# Patient Record
Sex: FEMALE | Race: WHITE | Employment: OTHER | URBAN - METROPOLITAN AREA
[De-identification: names, ages, dates, MRNs, and addresses within clinical notes are randomized per-mention and may not be internally consistent; named-entity substitution may affect disease eponyms.]

---

## 2017-04-17 ENCOUNTER — TRANSFERRED RECORDS (OUTPATIENT)
Dept: HEALTH INFORMATION MANAGEMENT | Facility: CLINIC | Age: 47
End: 2017-04-17

## 2017-05-01 ENCOUNTER — MEDICAL CORRESPONDENCE (OUTPATIENT)
Dept: HEALTH INFORMATION MANAGEMENT | Facility: CLINIC | Age: 47
End: 2017-05-01

## 2017-06-06 ENCOUNTER — TRANSFERRED RECORDS (OUTPATIENT)
Dept: HEALTH INFORMATION MANAGEMENT | Facility: CLINIC | Age: 47
End: 2017-06-06

## 2017-07-03 ENCOUNTER — TRANSFERRED RECORDS (OUTPATIENT)
Dept: HEALTH INFORMATION MANAGEMENT | Facility: CLINIC | Age: 47
End: 2017-07-03

## 2017-08-03 ENCOUNTER — TRANSFERRED RECORDS (OUTPATIENT)
Dept: HEALTH INFORMATION MANAGEMENT | Facility: CLINIC | Age: 47
End: 2017-08-03

## 2017-08-07 ENCOUNTER — TRANSFERRED RECORDS (OUTPATIENT)
Dept: HEALTH INFORMATION MANAGEMENT | Facility: CLINIC | Age: 47
End: 2017-08-07

## 2017-08-19 ENCOUNTER — HEALTH MAINTENANCE LETTER (OUTPATIENT)
Age: 47
End: 2017-08-19

## 2017-08-24 ENCOUNTER — TRANSFERRED RECORDS (OUTPATIENT)
Dept: HEALTH INFORMATION MANAGEMENT | Facility: CLINIC | Age: 47
End: 2017-08-24

## 2017-08-31 ENCOUNTER — TRANSFERRED RECORDS (OUTPATIENT)
Dept: HEALTH INFORMATION MANAGEMENT | Facility: CLINIC | Age: 47
End: 2017-08-31

## 2017-09-07 ENCOUNTER — TRANSFERRED RECORDS (OUTPATIENT)
Dept: HEALTH INFORMATION MANAGEMENT | Facility: CLINIC | Age: 47
End: 2017-09-07

## 2017-09-13 ENCOUNTER — TRANSFERRED RECORDS (OUTPATIENT)
Dept: HEALTH INFORMATION MANAGEMENT | Facility: CLINIC | Age: 47
End: 2017-09-13

## 2017-09-13 ENCOUNTER — PRE VISIT (OUTPATIENT)
Dept: ORTHOPEDICS | Facility: CLINIC | Age: 47
End: 2017-09-13

## 2017-09-13 NOTE — TELEPHONE ENCOUNTER
1.  Date/reason for appt: 9/22/17 -- Stenosis, herniation L4-L5    2.  Referring provider: Chloe Encarnacion    3.  Call to patient (Yes / No - short description): no, transferred from Call Center -- spoke to pt.  Referred from PN and has additional records at [facility], [facility] and [facility] and [facility].  Emailed 3 SHERIN's to [email or web address]    4.  Previous care at / records requested from: [facility], [facility], [facility] and [facility]

## 2017-09-15 NOTE — TELEPHONE ENCOUNTER
Records received from [facility].   Included  Office notes: 5/10/17, 6/23/17, 8/3/17, 8/7/17, 8/31/17  PT/OT notes: 5/1/17, 5/18/17, 5/22/17, 5/30/17, 6/5/17  Radiology reports: MRI lumbar 4/17/17  MRI cervical 6/13/16  Injections: right L4-5 transforaminal epidural steroid injection 8/24/17  Other: EMG 8/7/17    Missing/needed records: Dr. Nestor Sheth records

## 2017-09-16 ASSESSMENT — ENCOUNTER SYMPTOMS
DIFFICULTY URINATING: 1
JOINT SWELLING: 0
BACK PAIN: 1
WEIGHT LOSS: 0
POLYPHAGIA: 0
INCREASED ENERGY: 1
FEVER: 0
NIGHT SWEATS: 1
DYSURIA: 0
DISTURBANCES IN COORDINATION: 1
HEADACHES: 1
NERVOUS/ANXIOUS: 1
WEAKNESS: 1
NECK PAIN: 1
SEIZURES: 0
LOSS OF CONSCIOUSNESS: 0
HOT FLASHES: 1
MUSCLE CRAMPS: 1
WEIGHT GAIN: 0
DIZZINESS: 1
PANIC: 1
POLYDIPSIA: 1
DEPRESSION: 1
FLANK PAIN: 1
NUMBNESS: 1
FATIGUE: 1
HALLUCINATIONS: 0
DECREASED LIBIDO: 1
SPEECH CHANGE: 0
DECREASED CONCENTRATION: 1
TREMORS: 0
MEMORY LOSS: 1
MUSCLE WEAKNESS: 1
HEMATURIA: 0
PARALYSIS: 0
ARTHRALGIAS: 1
TINGLING: 1
INSOMNIA: 0
STIFFNESS: 1
DECREASED APPETITE: 1
ALTERED TEMPERATURE REGULATION: 1
CHILLS: 0
MYALGIAS: 1

## 2017-09-19 NOTE — PROGRESS NOTES
Spine Surgery Consultation    REFERRING PHYSICIAN: Chloe Encarnacion   PRIMARY CARE PHYSICIAN: Bernie Mata           Chief Complaint:   Back Pain (low back pain radiating to bilateral lower extremities, right greater than left)      History of Present Illness:  Symptom Profile Including: location of symptoms, onset, severity, exacerbating/alleviating factors, previous treatments:        Laurie Pradhan is a 47 year old female who presents for evaluation of neurogenic claudication and bilateral leg cramping type symptoms. worse when she lies down. She can't walk very far and has to use a cane or a walker. She is severely limited in her activities. She was previously working as a writer, educator and therapist but she has had 2 slow down substantially due to the symptoms. She feels it is a zinging type pain. It is worse when she tries to straighten her leg, particularly on the right. It does go into both legs but is worse on the right than the left.    She is previously tried anti-inflammatories and various types of muscle relaxants. She is also had an L4 5 epidural steroid injection. This made the symptoms worse. She had an EMG done which did not show any obvious pathology. She also had both a supine in a standing lumbar MRI. The supine MRI is available to me, but the standing MRI is not. She denies any bowel or bladder symptoms. 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 She has been both to neurology, physical medicine and rehabilitation, pain medicine as well as her primary care doctor in search of an answer regarding this problem.             Past Medical History:     Past Medical History:   Diagnosis Date     Allergic rhinitis due to other allergen     seasonal     Attention deficit disorder without mention of hyperactivity     follows with Dr. Monika Whittaker- psychiatrist at [facility]     Back injury March 2015    excruciating pain whenever I drove my Prius... injury?     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     Reduced vision     nearsightedness began, glasses/contacts     Surgical complication 2007    emergency ; very ill after            Past Surgical History:     Past Surgical History:   Procedure Laterality Date     C NONSPECIFIC PROCEDURE      removal of a cyst between bladder and uterus     C STOMACH SURGERY PROCEDURE UNLISTED  2007    emergency c section     HC TOOTH EXTRACTION W/FORCEP              Social History:     Social History   Substance Use Topics     Smoking status: Former Smoker     Packs/day: 0.50     Years: 10.00     Types: Cigarettes     Smokeless tobacco: Never Used      Comment: 1 daily     Alcohol use Yes      Comment: social            Family History:     Family History   Problem Relation Age of Onset     C.A.D. Maternal Grandfather      older age; bypass surgery     Arthritis Maternal Grandfather      Eye Disorder Maternal Grandfather      CEREBROVASCULAR DISEASE Maternal Grandmother      Genitourinary Problems Maternal Grandmother      frequent bladder infections in older age     Gynecology Maternal Grandmother      CANCER Maternal Grandmother      leg     Prostate Cancer Father      Hypertension Father      Cardiac Sudden Death Father      we think so; or maybe an aneurism     Arthritis Mother      Depression Mother      Eye Disorder Mother      Psychotic Disorder Mother      Eye Disorder Paternal Grandfather      blind            Allergies:     Allergies   Allergen Reactions     Dilaudid [Codeine] Itching     Penicillin G             Medications:     Current Outpatient Prescriptions  "  Medication     ibuprofen 200 MG capsule     Cholecalciferol (VITAMIN D3) 1000 UNITS CAPS     cyclobenzaprine (FLEXERIL) 5 MG tablet     Fluocinolone Acetonide (FLUOCINOLONE ACETAONIDE) 0.01 % oil     ketoconazole (NIZORAL) 2 % shampoo     LORazepam (ATIVAN) 0.5 MG tablet     methocarbamol (ROBAXIN) 500 MG tablet     amphetamine-dextroamphetamine (ADDERALL XR) 15 MG per 24 hr capsule     amphetamine-dextroamphetamine (ADDERALL) 20 MG per tablet     traZODone (DESYREL) 50 MG tablet     ACETAMINOPHEN PO     PROZAC 20 MG OR CAPS     No current facility-administered medications for this visit.              Review of Systems:     A 10 point ROS was performed and reviewed. Specific responses to these questions are noted at the end of the document.         Physical Exam:   Vitals: Ht 1.74 m (5' 8.5\")  Wt 62.9 kg (138 lb 9.6 oz)  BMI 20.77 kg/m2  Constitutional: awake, alert, cooperative, no apparent distress, appears stated age.    Eyes: The sclera are white.  Ears, Nose, Throat: The trachea is midline.  Psychiatric: The patient has a normal affect.  Respiratory: breathing non-labored  Cardiovascular: The extremities are warm and perfused.  Skin: no obvious rashes or lesions.  Musculoskeletal, Neurologic, and Spine:   The bilateral lower extremities are examined. She has 5 out of 5 strength in hip flexion and knee extension deflection tibialis anterior and ankle plantar flexion bilaterally. Her left extensor hallux longus is 5 out of 5 but the right is 4 out of 5. She is a mildly positive right straight leg raise. She is diffusely hyperesthetic. +2 patellar and Achilles reflexes. She is observed to walk with an antalgic gait. I did test abdominal reflexes and thoracic sensation and these are normal. I also examined her upper extremities which had normal strength and sensation throughout. Negative Carla sign.         Imaging:   We ordered and independently reviewed new radiographs at this clinic visit. The results " were discussed with the patient.  Findings include:    New AP lateral flexion-extension lumbar radiographs were ordered and reviewed today. No obvious instability. Relative flattening of the lumbar lordosis. Mild spondylosis worst at L4 5 and L5-S1.    Outside MRI from April 17, 2017 is reviewed. Mild spondylosis at L4 5 with a broad-based disc bulge on the left side appears to cause slight impingement of the traversing L5 root. There is an annular tear of the L4 5 disc.             Assessment and Plan:   Assessment:  47 year old female with bilateral leg cramping and pain.     Plan:  1. Her April MRI is not very impressive. There is a small annular tear but no severe stenosis.  2. I would like to review her standing MRI. It is possible there is some dynamic change which could explain the symptoms.  3. It is also possible to have diffuse leg cramping and hyperesthesia in the setting of a thoracic or cervical spinal cord compression. Thus I would like her to obtain cervical and thoracic MRIs.  4. We discussed trying gabapentin. She does not want to do this. We also discussed attempting a discogram to see if the L4 5 annular tear is contributed to her symptoms. She would prefer not to do this at this time.  5. I will see her back in clinic once the additional imaging is available. No new x-rays needed at that visit.      Respectfully,  Salvador Smith MD  Spine Surgery  [facility]      Answers for HPI/ROS submitted by the patient on 9/16/2017   General Symptoms: Yes  Skin Symptoms: No  HENT Symptoms: No  EYE SYMPTOMS: No  HEART SYMPTOMS: No  LUNG SYMPTOMS: No  INTESTINAL SYMPTOMS: No  URINARY SYMPTOMS: Yes  GYNECOLOGIC SYMPTOMS: Yes  BREAST SYMPTOMS: No  SKELETAL SYMPTOMS: Yes  BLOOD SYMPTOMS: No  NERVOUS SYSTEM SYMPTOMS: Yes  MENTAL HEALTH SYMPTOMS: Yes  Fever: No  Loss of appetite: Yes  Weight loss: No  Weight gain: No  Fatigue: Yes  Night sweats: Yes  Chills: No  Increased stress: Yes  Excessive  hunger: No  Excessive thirst: Yes  Feeling hot or cold when others believe the temperature is normal: Yes  Loss of height: No  Post-operative complications: No  Surgical site pain: Yes  Hallucinations: No  Change in or Loss of Energy: Yes  Hyperactivity: No  Confusion: No  Trouble holding urine or incontinence: No  Pain or burning: No  Trouble starting or stopping: No  Increased frequency of urination: Yes  Blood in urine: No  Decreased frequency of urination: No  Frequent nighttime urination: Yes  Flank pain: Yes  Difficulty emptying bladder: Yes  Back pain: Yes  Muscle aches: Yes  Neck pain: Yes  Swollen joints: No  Joint pain: Yes  Bone pain: Yes  Muscle cramps: Yes  Muscle weakness: Yes  Joint stiffness: Yes  Bone fracture: No  Trouble with coordination: Yes  Dizziness or trouble with balance: Yes  Fainting or black-out spells: No  Memory loss: Yes  Headache: Yes  Seizures: No  Speech problems: No  Tingling: Yes  Tremor: No  Weakness: Yes  Difficulty walking: Yes  Paralysis: No  Numbness: Yes  Bleeding or spotting between periods: Yes  Heavy or painful periods: Yes  Irregular periods: Yes  Vaginal discharge: Yes  Hot flashes: Yes  Vaginal dryness: Yes  Genital ulcers: No  Reduced libido: Yes  Painful intercourse: No  Difficulty with sexual arousal: Yes  Post-menopausal bleeding: No  Nervous or Anxious: Yes  Depression: Yes  Trouble sleeping: No  Trouble thinking or concentrating: Yes  Mood changes: No  Panic attacks: Yes

## 2017-09-20 DIAGNOSIS — M54.5 LOW BACK PAIN, UNSPECIFIED BACK PAIN LATERALITY, UNSPECIFIED CHRONICITY, WITH SCIATICA PRESENCE UNSPECIFIED: Primary | ICD-10-CM

## 2017-09-21 NOTE — TELEPHONE ENCOUNTER
Intake form received from [facility].  No other records available.      All records received.  Closing encounter.

## 2017-09-22 ENCOUNTER — OFFICE VISIT (OUTPATIENT)
Dept: ORTHOPEDICS | Facility: CLINIC | Age: 47
End: 2017-09-22

## 2017-09-22 VITALS — HEIGHT: 69 IN | WEIGHT: 138.6 LBS | BODY MASS INDEX: 20.53 KG/M2

## 2017-09-22 DIAGNOSIS — M48.062 SPINAL STENOSIS OF LUMBAR REGION WITH NEUROGENIC CLAUDICATION: Primary | ICD-10-CM

## 2017-09-22 RX ORDER — TRAZODONE HYDROCHLORIDE 50 MG/1
50 TABLET, FILM COATED ORAL AT BEDTIME
COMMUNITY

## 2017-09-22 RX ORDER — CYCLOBENZAPRINE HCL 5 MG
5 TABLET ORAL 2 TIMES DAILY PRN
COMMUNITY
Start: 2017-06-27 | End: 2021-10-01

## 2017-09-22 RX ORDER — KETOCONAZOLE 20 MG/ML
SHAMPOO TOPICAL
COMMUNITY
Start: 2017-04-04

## 2017-09-22 RX ORDER — DEXTROAMPHETAMINE SACCHARATE, AMPHETAMINE ASPARTATE, DEXTROAMPHETAMINE SULFATE AND AMPHETAMINE SULFATE 5; 5; 5; 5 MG/1; MG/1; MG/1; MG/1
TABLET ORAL
COMMUNITY
Start: 2017-09-21

## 2017-09-22 RX ORDER — DEXTROAMPHETAMINE SACCHARATE, AMPHETAMINE ASPARTATE MONOHYDRATE, DEXTROAMPHETAMINE SULFATE AND AMPHETAMINE SULFATE 3.75; 3.75; 3.75; 3.75 MG/1; MG/1; MG/1; MG/1
15 CAPSULE, EXTENDED RELEASE ORAL
COMMUNITY
Start: 2017-04-03 | End: 2021-10-01

## 2017-09-22 RX ORDER — OMEGA-3 FATTY ACIDS/FISH OIL 300-1000MG
400 CAPSULE ORAL EVERY 6 HOURS PRN
COMMUNITY
Start: 2007-02-13 | End: 2021-10-01

## 2017-09-22 RX ORDER — FLUOCINOLONE ACETONIDE 0.11 MG/ML
OIL TOPICAL
COMMUNITY
Start: 2017-03-23

## 2017-09-22 RX ORDER — LORAZEPAM 0.5 MG/1
TABLET ORAL
COMMUNITY
Start: 2017-04-03

## 2017-09-22 RX ORDER — METHOCARBAMOL 500 MG/1
500 TABLET, FILM COATED ORAL
COMMUNITY
Start: 2016-09-06

## 2017-09-22 NOTE — NURSING NOTE
"Reason For Visit:   Chief Complaint   Patient presents with     Back Pain     low back pain radiating to bilateral lower extremities, right greater than left       Primary MD: Radha Max  Ref. MD:      ?  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  Date of injury: 3/2017  Type of injury: pain started after moving from [facility].  Date of surgery: none  Smoker: No      Ht 1.74 m (5' 8.5\")  Wt 62.9 kg (138 lb 9.6 oz)  BMI 20.77 kg/m2    Pain Assessment  Patient Currently in Pain: Yes  0-10 Pain Scale: 7  Primary Pain Location: Leg  Pain Orientation: Right  Pain Descriptors: Stabbing, Shooting, Cramping, Radiating, Tightness, Pressure  Alleviating Factors: Rest  Aggravating Factors: Standing, Walking    Oswestry (SUSANA) Questionnaire    OSWESTRY DISABILITY INDEX 9/16/2017   Section 1 - Pain intensity 4   Section 2 - Personal care (washing, dressing, etc.)  3   Section 3 - Lifting 5   Section 4 - Walking 4   Section 5 - Sitting 3   Section 6 - Standing 5   Section 7 - Sleeping 1   Section 8 - Sex life (if applicable) 3   Section 9 - Social life 4   Section 10 - Traveling 5   Count 10   Sum 37   Oswestry Score (%) 74   SECTION 1-PAIN INTENSITY The pain is very severe at the moment.   SECTION 2-PERSONAL CARE (WASHING,DRESSING,ETC.) I need some help but manage most of my personal care.   SECTION 3-LIFTING I cannot lift or carry anything at all.   SECTION 4-WALKING I can only walk using a cane or crutches.   SECTION 5-SITTING Pain prevents me from sitting for more than half an hour.   SECTION 6-STANDING Pain prevents me from standing at all.   SECTION 7-SLEEPING My sleep is occasionally interrupted by pain.   SECTION 8-SEX LIFE (IF APPLICABLE) My sex life is severely restricted by pain.   SECTION 9-SOCIAL LIFE Pain has restricted my social life to home.   SECTION 10-TRAVELING Pain prevents me from traveling except to receive treatment.   Oswestry " Disability Index: Count 10   SUSANA: Total Score = SUM (total for answered questions) 37   Computed Oswestry Score 74 (%)   Some recent data might be hidden        Visual Analog Pain Scale  Back Pain Scale 0-10: (P) 7  Right leg pain: (P) 10  Left leg pain: (P) 5      Promis 10 Assessment    PROMIS 10 9/16/2017   In general, would you say your health is: Very good   In general, would you say your quality of life is: Very good   In general, how would you rate your physical health? Good   In general, how would you rate your mental health, including your mood and your ability to think? Very good   In general, how would you rate your satisfaction with your social activities and relationships? Very good   In general, please rate how well you carry out your usual social activities and roles Very good   To what extent are you able to carry out your everyday physical activities such as walking, climbing stairs, carrying groceries, or moving a chair? Not at all   How often have you been bothered by emotional problems such as feeling anxious, depressed or irritable? Often   How would you rate your fatigue on average? Severe   How would you rate your pain on average?   0 = No Pain  to  10 = Worst Imaginable Pain 9   Global Physical Health Score : Raw Score 8 (SUM : G03 - G06 - G07 - G08)   Global Mentall Health Score : Raw Score 14 (SUM : G02 - G04 - G05 - G10)   Total (Physical + Mental Health Score) 22   In general, would you say your health is: 4   In general, would you say your quality of life is: 4   In general, how would you rate your physical health? 3   In general, how would you rate your mental health, including your mood and your ability to think? 4   In general, how would you rate your satisfaction with your social activities and relationships? 4   In general, please rate how well you carry out your usual social activities and roles. (This includes activities at home, at work and in your community, and responsibilities  as a parent, child, spouse, employee, friend, etc.) 4   To what extent are you able to carry out your everyday physical activities such as walking, climbing stairs, carrying groceries, or moving a chair? 1   In the past 7 days, how often have you been bothered by emotional problems such as feeling anxious, depressed, or irritable? 2   In the past 7 days, how would you rate your fatigue on average? 2   In the past 7 days, how would you rate your pain on average, where 0 means no pain, and 10 means worst imaginable pain? 9   Global Mental Health Score 14   Global Physical Health Score 8   PROMIS TOTAL - SUBSCORES 22   Pain question re-calculation - no clinical value 2   Some recent data might be hidden        Monalisa López LPN

## 2017-09-22 NOTE — MR AVS SNAPSHOT
"              After Visit Summary   9/22/2017    Laurie Pradhan    MRN: [id number]           Patient Information     Date Of Birth          1970        Visit Information        Provider Department      9/22/2017 8:30 AM Salvador Smith MD [facility]        Today's Diagnoses     Spinal stenosis of lumbar region with neurogenic claudication    -  1       Follow-ups after your visit        Who to contact     Please call your clinic at [phone] to:    Ask questions about your health    Make or cancel appointments    Discuss your medicines    Learn about your test results    Speak to your doctor   If you have compliments or concerns about an experience at your clinic, or if you wish to file a complaint, please contact [facility] Patient Relations at [phone] or email us at [email or web address]         Additional Information About Your Visit        MyChart Information     [employer] gives you secure access to your electronic health record. If you see a primary care provider, you can also send messages to your care team and make appointments. If you have questions, please call your primary care clinic.  If you do not have a primary care provider, please call [phone] and they will assist you.      Tarisa is an electronic gateway that provides easy, online access to your medical records. With Tarisa, you can request a clinic appointment, read your test results, renew a prescription or communicate with your care team.     To access your existing account, please contact your [facility] or call [phone] for assistance.        Care EveryWhere ID     This is your Care EveryWhere ID. This could be used by other organizations to access your [facility] medical records  [id number]        Your Vitals Were     Height BMI (Body Mass Index)                1.74 m (5' 8.5\") 20.77 kg/m2           Blood Pressure from " Last 3 Encounters:   01/03/06 112/74   09/01/05 104/74   03/29/05 126/74    Weight from Last 3 Encounters:   09/22/17 62.9 kg (138 lb 9.6 oz)   01/03/06 68 kg (150 lb)   09/01/05 67.6 kg (149 lb 1.6 oz)              Today, you had the following     No orders found for display       Primary Care Provider Office Phone # Fax #    Radha Max [phone]       [facility] [address] [place] [address] [place] [address]        Equal Access to Services     [facility]: Hadii aad ku hadasho Soomaali, waaxda luqadaha, qaybta kaalmada adeegyada, cameron le . So [facility] [phone].    ATENCIÓN: Si habla español, tiene a estrada disposición servicios gratuitos de asistencia lingüística. [facility] [phone].    We comply with applicable federal civil rights laws and [place] laws. We do not discriminate on the basis of race, color, national origin, age, disability sex, sexual orientation or gender identity.            Thank you!     Thank you for choosing [facility]  for your care. Our goal is always to provide you with excellent care. Hearing back from our patients is one way we can continue to improve our services. Please take a few minutes to complete the written survey that you may receive in the mail after your visit with us. Thank you!             Your Updated Medication List - Protect others around you: Learn how to safely use, store and throw away your medicines at www.disposemymeds.org.          This list is accurate as of: 9/22/17  9:09 AM.  Always use your most recent med list.                   Brand Name Dispense Instructions for use Diagnosis    ACETAMINOPHEN PO      Take 1,000 mg by mouth every 6 hours as needed        * amphetamine-dextroamphetamine 15 MG per 24 hr capsule    ADDERALL XR     Take 15 mg by mouth        * amphetamine-dextroamphetamine 20 MG per tablet    ADDERALL     Take 1.5 tablet am, 1 tab at noon and 1/2 afternoon  prn.----Next visit due in October        cyclobenzaprine 5 MG tablet    FLEXERIL     Take 5 mg by mouth 2 times daily as needed        fluocinolone acetaonide 0.01 % oil      Apply to scaling areas of the scalp, face, and ears at bedtime for five to eight hours.  Cover with shower cap.  Wash off in AM.        ibuprofen 200 MG capsule      Take 400 mg by mouth every 6 hours as needed        ketoconazole 2 % shampoo    NIZORAL     Apply topically twice a week        LORazepam 0.5 MG tablet    ATIVAN          methocarbamol 500 MG tablet    ROBAXIN     Take 500 mg by mouth nightly as needed        PROZAC 20 MG capsule   Generic drug:  FLUoxetine     30    1 CAP PO QD (Once per day) in AM        traZODone 50 MG tablet    DESYREL     Take 50 mg by mouth At Bedtime        vitamin D3 1000 UNITS Caps      Take 5,000 Units by mouth daily        * Notice:  This list has 2 medication(s) that are the same as other medications prescribed for you. Read the directions carefully, and ask your doctor or other care provider to review them with you.

## 2017-09-22 NOTE — LETTER
9/22/2017       RE: Laurie Pradhan  [address]  [place] [address]     Dear Colleague,    Thank you for referring your patient, Laurie Pradhan, to the [facility] at [facility]. Please see a copy of my visit note below.    Spine Surgery Consultation    REFERRING PHYSICIAN: Chloe Encarnacion   PRIMARY CARE PHYSICIAN: Bernie Mata           Chief Complaint:   Back Pain (low back pain radiating to bilateral lower extremities, right greater than left)      History of Present Illness:  Symptom Profile Including: location of symptoms, onset, severity, exacerbating/alleviating factors, previous treatments:        Laurie Pradhan is a 47 year old female who presents for evaluation of neurogenic claudication and bilateral leg cramping type symptoms. worse when she lies down. She can't walk very far and has to use a cane or a walker. She is severely limited in her activities. She was previously working as a writer, educator and therapist but she has had 2 slow down substantially due to the symptoms. She feels it is a zinging type pain. It is worse when she tries to straighten her leg, particularly on the right. It does go into both legs but is worse on the right than the left.    She is previously tried anti-inflammatories and various types of muscle relaxants. She is also had an L4 5 epidural steroid injection. This made the symptoms worse. She had an EMG done which did not show any obvious pathology. She also had both a supine in a standing lumbar MRI. The supine MRI is available to me, but the standing MRI is not. She denies any bowel or bladder symptoms. 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 She has been both to neurology, physical medicine and rehabilitation, pain medicine as well as her primary care doctor in search of an answer regarding this problem.             Past Medical History:     Past Medical History:    Diagnosis Date     Allergic rhinitis due to other allergen     seasonal     Attention deficit disorder without mention of hyperactivity     follows with Dr. Monika Whittaker- psychiatrist at [place]     Back injury 2015    excruciating pain whenever I drove my Prius... injury?     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     Reduced vision     nearsightedness began, glasses/contacts     Surgical complication 2007    emergency ; very ill after            Past Surgical History:     Past Surgical History:   Procedure Laterality Date     C NONSPECIFIC PROCEDURE      removal of a cyst between bladder and uterus     C STOMACH SURGERY PROCEDURE UNLISTED  2007    emergency c section     HC TOOTH EXTRACTION W/FORCEP              Social History:     Social History   Substance Use Topics     Smoking status: Former Smoker     Packs/day: 0.50     Years: 10.00     Types: Cigarettes     Smokeless tobacco: Never Used      Comment: 1 daily     Alcohol use Yes      Comment: social            Family History:     Family History   Problem Relation Age of Onset     C.A.D. Maternal Grandfather      older age; bypass surgery     Arthritis Maternal Grandfather      Eye Disorder Maternal Grandfather      CEREBROVASCULAR DISEASE Maternal Grandmother      Genitourinary Problems Maternal Grandmother      frequent bladder infections in older age     Gynecology Maternal Grandmother      CANCER Maternal Grandmother      leg     Prostate Cancer Father      Hypertension Father      Cardiac Sudden Death Father      we think so; or maybe an aneurism     Arthritis Mother   "    Depression Mother      Eye Disorder Mother      Psychotic Disorder Mother      Eye Disorder Paternal Grandfather      blind            Allergies:     Allergies   Allergen Reactions     Dilaudid [Codeine] Itching     Penicillin G             Medications:     Current Outpatient Prescriptions   Medication     ibuprofen 200 MG capsule     Cholecalciferol (VITAMIN D3) 1000 UNITS CAPS     cyclobenzaprine (FLEXERIL) 5 MG tablet     Fluocinolone Acetonide (FLUOCINOLONE ACETAONIDE) 0.01 % oil     ketoconazole (NIZORAL) 2 % shampoo     LORazepam (ATIVAN) 0.5 MG tablet     methocarbamol (ROBAXIN) 500 MG tablet     amphetamine-dextroamphetamine (ADDERALL XR) 15 MG per 24 hr capsule     amphetamine-dextroamphetamine (ADDERALL) 20 MG per tablet     traZODone (DESYREL) 50 MG tablet     ACETAMINOPHEN PO     PROZAC 20 MG OR CAPS     No current facility-administered medications for this visit.              Review of Systems:     A 10 point ROS was performed and reviewed. Specific responses to these questions are noted at the end of the document.         Physical Exam:   Vitals: Ht 1.74 m (5' 8.5\")  Wt 62.9 kg (138 lb 9.6 oz)  BMI 20.77 kg/m2  Constitutional: awake, alert, cooperative, no apparent distress, appears stated age.    Eyes: The sclera are white.  Ears, Nose, Throat: The trachea is midline.  Psychiatric: The patient has a normal affect.  Respiratory: breathing non-labored  Cardiovascular: The extremities are warm and perfused.  Skin: no obvious rashes or lesions.  Musculoskeletal, Neurologic, and Spine:   The bilateral lower extremities are examined. She has 5 out of 5 strength in hip flexion and knee extension deflection tibialis anterior and ankle plantar flexion bilaterally. Her left extensor hallux longus is 5 out of 5 but the right is 4 out of 5. She is a mildly positive right straight leg raise. She is diffusely hyperesthetic. +2 patellar and Achilles reflexes. She is observed to walk with an antalgic gait. I " did test abdominal reflexes and thoracic sensation and these are normal. I also examined her upper extremities which had normal strength and sensation throughout. Negative Carla sign.         Imaging:   We ordered and independently reviewed new radiographs at this clinic visit. The results were discussed with the patient.  Findings include:    New AP lateral flexion-extension lumbar radiographs were ordered and reviewed today. No obvious instability. Relative flattening of the lumbar lordosis. Mild spondylosis worst at L4 5 and L5-S1.    Outside MRI from April 17, 2017 is reviewed. Mild spondylosis at L4 5 with a broad-based disc bulge on the left side appears to cause slight impingement of the traversing L5 root. There is an annular tear of the L4 5 disc.             Assessment and Plan:   Assessment:  47 year old female with bilateral leg cramping and pain.     Plan:  1. Her April MRI is not very impressive. There is a small annular tear but no severe stenosis.  2. I would like to review her standing MRI. It is possible there is some dynamic change which could explain the symptoms.  3. It is also possible to have diffuse leg cramping and hyperesthesia in the setting of a thoracic or cervical spinal cord compression. Thus I would like her to obtain cervical and thoracic MRIs.  4. We discussed trying gabapentin. She does not want to do this. We also discussed attempting a discogram to see if the L4 5 annular tear is contributed to her symptoms. She would prefer not to do this at this time.  5. I will see her back in clinic once the additional imaging is available. No new x-rays needed at that visit.      Respectfully,  Salvador Smith MD  Spine Surgery  [facility]

## 2017-09-28 ENCOUNTER — TRANSFERRED RECORDS (OUTPATIENT)
Dept: HEALTH INFORMATION MANAGEMENT | Facility: CLINIC | Age: 47
End: 2017-09-28

## 2017-10-02 ENCOUNTER — THERAPY VISIT (OUTPATIENT)
Dept: PHYSICAL THERAPY | Facility: CLINIC | Age: 47
End: 2017-10-02
Payer: COMMERCIAL

## 2017-10-02 DIAGNOSIS — M48.062 SPINAL STENOSIS OF LUMBAR REGION WITH NEUROGENIC CLAUDICATION: Primary | ICD-10-CM

## 2017-10-02 PROCEDURE — 97530 THERAPEUTIC ACTIVITIES: CPT | Mod: GP | Performed by: PHYSICAL THERAPIST

## 2017-10-02 PROCEDURE — 97161 PT EVAL LOW COMPLEX 20 MIN: CPT | Mod: GP | Performed by: PHYSICAL THERAPIST

## 2017-10-02 NOTE — LETTER
[facility]  [address]  [place] [address]  [phone]  October 3, 2017  Re: Laurie Pradhan   :   1970  MRN:  [id number]   REFERRING PHYSICIAN:   Chloe Encarnacion    [facility]  Date of Initial Evaluation:  10/2/2017  Visits: 1 Rxs Used: 1  Reason for Referral:  Spinal stenosis of lumbar region with neurogenic claudication    [facility] Initial Evaluation    Subjective:  HPI Comments: Laurie complains of pain that originates at right groin, sometimes extending to anterior thigh, occasionally reporting briseyda horses behind the knee when she walks. Minor back pain at the left buttock and at B sacral borders. Most pain at right anterior joint line. Pain is aggravated by walking and has required her to start utilizing a SPC. Pain is alleviated by sitting, though she still has dull pain. Pain is described as cramping and jabbing. Pain is present every day, better in the morning but starts as soon as she stands in the shower. About a year and a half ago, she describes severe left lower back pain while driving. She also reports increase in symptoms in march of this year while preparing her home to move. At that time she had began experiencing nightly briseyda horses that would cause her to wake up at night, cause sweating, and nausea as well. In April she began experiencing the anterior groin pain after yoga but believes this started with the cramping in the posterior calf and thigh cramping. She limited her driving at this time and symptoms resolved.  She cannot drive long distances now due to cramping. Lumbar MRI in April showing minor degenerative changes and annular tear. She did PT at [facility] with nerve flossing, bridging, and squats. This did not help her pain. She also utilized the chiropractor 3 days per week for adjustments and massage which decreased symptoms for about a  "day. She had an epidural this summer and had a \"bad reaction\" with no relief. She has not been able to participate in yoga due to her current symptoms but does some stretching and poses at home on her own. She has been tested for B12, MS and other various conditions. Describes continuous menstruation for 1 month at a low level. She feels that it is worsening as it is coming down into her leg. 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 General health is rated as good. PMH of fibromyalgia and cysts in pelvic region. Pain is rated 9/10 at its worst and general discomfort in sitting.  Pertinent medical history includes:  Fibromyalgia and depression.  Medical allergies: yes (penicillin, dilaudid).  Other surgeries include:  Other (, cyst removal).  Current medications:  Anti-inflammatory, sleep medication, muscle relaxants, pain medication and other (ADD).  Current occupation is Teacher/therapist.    Primary job tasks include:  Prolonged sitting.    The history is provided by the patient.               Objective:  Gait:    Gait Type:  Antalgic   Weight Bearing Status:  WBAT   Assistive Devices:  Cane  Flexibility/Screens:     Re: Laurie H Lorne   :   1970    Negative screens: Hip   Lumbar/SI Evaluation  ROM:    AROM Lumbar:   Flexion:          Full, toe ankle joint line  Ext:                    Min loss and pain free   Side Bend:        Left:  Mod loss, pain free    Right:  Mod loss pain free  Rotation:           Left:     Right:   Side Glide:        Left:     Right:   Lumbar Myotomes:    T12-L3 (Hip Flex):  Left: 5    Right: 5  L2-4 (Quads):  Left:  5    Right:  5  L4 (Ankle DF):  Left:  5    Right:  5  L5 (Great Toe Ext): Left: 5    Right: 5   S1 (Toe Raise):  Left: 5    Right: 5  Lumbar DTR's:    L4 (Quad):  Left:  1   Right:  1  S1 (Achilles):  Left:  1   Right:  1  Cord Signs:  Cord signs lumbar: Clonus negative.  Cord sign negative:  Babinksi's left " or Babinski's right  Neural Tension/Mobility:  Lumbar:  Normal (SLR negative B, increased stretch on the right)    Spinal Segmental Conclusions: Spring testing negative for symptom reproduction  SI joint/Sacrum:      Left negative at:    Squish  Right negative at:  Squish  Hip Evaluation  Hip PROM:  : IR: 40 ER: 50 Flexion: 125. : Hip flexion: 120, ER 55, IR 40.  Hip Strength:  : Adduction in 0, 45, and 90 strong and pain free.  Hip Special Testing:   Special tests hip not assessed: acrtive SLR, decreased strength on right.    Assessment/Plan:    Patient is a 47 year old female with lumbar complaints.    Patient has the following significant findings with corresponding treatment plan.                Diagnosis 1:  Lumbar stenosis  Pain -  manual therapy, splint/taping/bracing/orthotics, self management, education and home program  Decreased strength - therapeutic exercise and therapeutic activities  Impaired gait - gait training  Impaired muscle performance - neuro re-education  Decreased function - therapeutic activities  Therapy Evaluation Codes:   1) History comprised of:   Personal factors that impact the plan of care:      None.    Comorbidity factors that impact the plan of care are:      Fibromyalgia.     Medications impacting care: None.  2) Examination of Body Systems comprised of:   Body structures and functions that impact the plan of care:    Re: Laurie Pradhan   :   1970       Pelvis.   Activity limitations that impact the plan of care are:      Standing and Walking.  3) Clinical presentation characteristics are:   Stable/Uncomplicated.  4) Decision-Making    Low complexity using standardized patient assessment instrument and/or measureable assessment of functional outcome.  Cumulative Therapy Evaluation is: Low complexity.  Previous and current functional limitations:  (See Goal Flow Sheet for this information)    Short term and Long term goals: (See Goal Flow Sheet for this information)    Communication ability:  Patient appears to be able to clearly communicate and understand verbal and written communication and follow directions correctly.  Treatment Explanation - The following has been discussed with the patient:   RX ordered/plan of care  Anticipated outcomes  Possible risks and side effects  This patient would benefit from PT intervention to resume normal activities.   Rehab potential is excellent.  Frequency:  1 X week, once daily  Duration:  for 8 weeks  Discharge Plan:  Achieve all LTG.  Independent in home treatment program.  Reach maximal therapeutic benefit.      Thank you for your referral.    INQUIRIES  Therapist: Kira Way, PT, ScD, [facility]  [facility]  [address] [place] [address] [id number] [address]  Phone: [phone]  Fax: [phone]

## 2017-10-02 NOTE — PROGRESS NOTES
"Subjective:    HPI Comments: Laurie complains of pain that originates at right groin, sometimes extending to anterior thigh, occasionally reporting briseyda horses behind the knee when she walks. Minor back pain at the left buttock and at B sacral borders. Most pain at right anterior joint line. Pain is aggravated by walking and has required her to start utilizing a SPC. Pain is alleviated by sitting, though she still has dull pain. Pain is described as cramping and jabbing. Pain is present every day, better in the morning but starts as soon as she stands in the shower. About a year and a half ago, she describes severe left lower back pain while driving. She also reports increase in symptoms in march of this year while preparing her home to move. At that time she had began experiencing nightly briseyda horses that would cause her to wake up at night, cause sweating, and nausea as well. In April she began experiencing the anterior groin pain after yoga but believes this started with the cramping in the posterior calf and thigh cramping. She limited her driving at this time and symptoms resolved.  She cannot drive long distances now due to cramping. Lumbar MRI in April showing minor degenerative changes and annular tear. She did PT at [facility] with nerve flossing, bridging, and squats. This did not help her pain. She also utilized the chiropractor 3 days per week for adjustments and massage which decreased symptoms for about a day. She had an epidural this summer and had a \"bad reaction\" with no relief. She has not been able to participate in yoga due to her current symptoms but does some stretching and poses at home on her own. She has been tested for B12, MS and other various conditions. Describes continuous menstruation for 1 month at a low level. She feels that it is worsening as it is coming down into her leg. 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 General health is " rated as good. PMH of fibromyalgia and cysts in pelvic region. Pain is rated 9/10 at its worst and general discomfort in sitting.     The history is provided by the patient.                       Objective:      Gait:    Gait Type:  Antalgic   Weight Bearing Status:  WBAT   Assistive Devices:  Cane      Flexibility/Screens:   Negative screens: Hip                    Lumbar/SI Evaluation  ROM:    AROM Lumbar:   Flexion:          Full, toe ankle joint line  Ext:                    Min loss and pain free   Side Bend:        Left:  Mod loss, pain free    Right:  Mod loss pain free  Rotation:           Left:     Right:   Side Glide:        Left:     Right:           Lumbar Myotomes:    T12-L3 (Hip Flex):  Left: 5    Right: 5  L2-4 (Quads):  Left:  5    Right:  5  L4 (Ankle DF):  Left:  5    Right:  5  L5 (Great Toe Ext): Left: 5    Right: 5   S1 (Toe Raise):  Left: 5    Right: 5  Lumbar DTR's:    L4 (Quad):  Left:  1   Right:  1  S1 (Achilles):  Left:  1   Right:  1  Cord Signs:  Cord signs lumbar: Clonus negative.    Cord sign negative:  Babinksi's left or Babinski's right    Neural Tension/Mobility:  Lumbar:  Normal (SLR negative B, increased stretch on the right)              Spinal Segmental Conclusions: Spring testing negative for symptom reproduction          SI joint/Sacrum:          Left negative at:    Squish    Right negative at:  Squish                                      Hip Evaluation  Hip PROM:  : IR: 40 ER: 50 Flexion: 125. : Hip flexion: 120, ER 55, IR 40.                          Hip Strength:  : Adduction in 0, 45, and 90 strong and pain free.                          Hip Special Testing:   Special tests hip not assessed: acrtive SLR, decreased strength on right.                     General     ROS    Assessment/Plan:      Patient is a 47 year old female with lumbar complaints.    Patient has the following significant findings with corresponding treatment plan.                Diagnosis 1:  Lumbar  stenosis  Pain -  manual therapy, splint/taping/bracing/orthotics, self management, education and home program  Decreased strength - therapeutic exercise and therapeutic activities  Impaired gait - gait training  Impaired muscle performance - neuro re-education  Decreased function - therapeutic activities    Therapy Evaluation Codes:   1) History comprised of:   Personal factors that impact the plan of care:      None.    Comorbidity factors that impact the plan of care are:      Fibromyalgia.     Medications impacting care: None.  2) Examination of Body Systems comprised of:   Body structures and functions that impact the plan of care:      Pelvis.   Activity limitations that impact the plan of care are:      Standing and Walking.  3) Clinical presentation characteristics are:   Stable/Uncomplicated.  4) Decision-Making    Low complexity using standardized patient assessment instrument and/or measureable assessment of functional outcome.  Cumulative Therapy Evaluation is: Low complexity.    Previous and current functional limitations:  (See Goal Flow Sheet for this information)    Short term and Long term goals: (See Goal Flow Sheet for this information)     Communication ability:  Patient appears to be able to clearly communicate and understand verbal and written communication and follow directions correctly.  Treatment Explanation - The following has been discussed with the patient:   RX ordered/plan of care  Anticipated outcomes  Possible risks and side effects  This patient would benefit from PT intervention to resume normal activities.   Rehab potential is excellent.    Frequency:  1 X week, once daily  Duration:  for 8 weeks  Discharge Plan:  Achieve all LTG.  Independent in home treatment program.  Reach maximal therapeutic benefit.    Please refer to the daily flowsheet for treatment today, total treatment time and time spent performing 1:1 timed codes.

## 2017-10-03 NOTE — PROGRESS NOTES
Subjective:    Patient is a 47 year old female presenting with rehab left ankle/foot hpi.                                      Pertinent medical history includes:  Fibromyalgia and depression.  Medical allergies: yes (penicillin, dilaudid).  Other surgeries include:  Other (, cyst removal).  Current medications:  Anti-inflammatory, sleep medication, muscle relaxants, pain medication and other (ADD).  Current occupation is Teacher/therapist.    Primary job tasks include:  Prolonged sitting.                                Objective:    System    Physical Exam    General     ROS    Assessment/Plan:

## 2017-10-05 ENCOUNTER — TELEPHONE (OUTPATIENT)
Dept: ORTHOPEDICS | Facility: CLINIC | Age: 47
End: 2017-10-05

## 2017-10-05 NOTE — TELEPHONE ENCOUNTER
Patient called to discuss the MRI results from 9/29/2017 at [facility].  These images and the report have been pushed to PACS for Dr. Smith to review.  This TOMN spoke with MAC Stone with Dr. Smith's office and she will contact the patient to discuss the MRI on 10/6/2017.    Monalisa López LPN

## 2017-10-12 ENCOUNTER — THERAPY VISIT (OUTPATIENT)
Dept: PHYSICAL THERAPY | Facility: CLINIC | Age: 47
End: 2017-10-12
Payer: COMMERCIAL

## 2017-10-12 ENCOUNTER — TELEPHONE (OUTPATIENT)
Dept: ORTHOPEDICS | Facility: CLINIC | Age: 47
End: 2017-10-12

## 2017-10-12 DIAGNOSIS — M48.062 SPINAL STENOSIS OF LUMBAR REGION WITH NEUROGENIC CLAUDICATION: ICD-10-CM

## 2017-10-12 PROCEDURE — 97140 MANUAL THERAPY 1/> REGIONS: CPT | Mod: GP | Performed by: PHYSICAL THERAPIST

## 2017-10-12 PROCEDURE — 97110 THERAPEUTIC EXERCISES: CPT | Mod: GP | Performed by: PHYSICAL THERAPIST

## 2017-10-12 PROCEDURE — 97530 THERAPEUTIC ACTIVITIES: CPT | Mod: GP | Performed by: PHYSICAL THERAPIST

## 2017-10-12 NOTE — TELEPHONE ENCOUNTER
with [employer] calls for note of pt last office visit from Dr Smith. Note faxed to Attn: Ubaldo @ # [phone]

## 2017-10-19 ENCOUNTER — THERAPY VISIT (OUTPATIENT)
Dept: PHYSICAL THERAPY | Facility: CLINIC | Age: 47
End: 2017-10-19
Payer: COMMERCIAL

## 2017-10-19 DIAGNOSIS — M48.062 SPINAL STENOSIS OF LUMBAR REGION WITH NEUROGENIC CLAUDICATION: ICD-10-CM

## 2017-10-19 PROCEDURE — 97530 THERAPEUTIC ACTIVITIES: CPT | Mod: GP | Performed by: PHYSICAL THERAPIST

## 2017-10-19 PROCEDURE — 97110 THERAPEUTIC EXERCISES: CPT | Mod: GP | Performed by: PHYSICAL THERAPIST

## 2017-11-03 ENCOUNTER — THERAPY VISIT (OUTPATIENT)
Dept: PHYSICAL THERAPY | Facility: CLINIC | Age: 47
End: 2017-11-03
Payer: COMMERCIAL

## 2017-11-03 DIAGNOSIS — M48.062 SPINAL STENOSIS OF LUMBAR REGION WITH NEUROGENIC CLAUDICATION: ICD-10-CM

## 2017-11-03 PROCEDURE — 97110 THERAPEUTIC EXERCISES: CPT | Mod: GP | Performed by: PHYSICAL THERAPIST

## 2017-11-03 PROCEDURE — 97530 THERAPEUTIC ACTIVITIES: CPT | Mod: GP | Performed by: PHYSICAL THERAPIST

## 2017-11-03 NOTE — PROGRESS NOTES
Subjective:    HPI                    Objective:    System    Physical Exam    General     ROS    Assessment/Plan:      PROGRESS  REPORT    Progress reporting period is from 10/2/17 to 11/3/17.       SUBJECTIVE  Subjective changes noted by patient:  .  Subjective: Pt unable to note any change in leg symptoms. Has been unable to increase her ablility to walk.  Finds that walking up or down hill better that walking on level surface.  Does not feel sx are worsening.      Current pain level is   .     Previous pain level was   Initial Pain level: 8/10.   Changes in function:  None  Adverse reaction to treatment or activity: None    OBJECTIVE  Changes noted in objective findings:    Objective: No reoccurance of thoracic pain.  Re-assessed:  AROM of lumbar spine cont to be WNL and does not affect sx.  ASLR test no longer (+).  SLR todaay is (-).  Uncertain if cramping pain in legs is orthopedic.  Encouraged patient to resume previous Yoga practice with exception of loaded flexion poses and to avoid poses that tension sciatic nerves.  Gave her guidelines re:  centralizaion of sx vs peripheralization.  Pt is cont to seek further evaluation of condition.     ASSESSMENT/PLAN  Updated problem list and treatment plan: Diagnosis 1:  Lumbar stenosis  Pain -  self management, education and home program  Decreased ROM/flexibility - manual therapy and therapeutic exercise  Decreased joint mobility - manual therapy and therapeutic exercise  Decreased strength - therapeutic exercise and therapeutic activities  Impaired gait - gait training  Impaired muscle performance - neuro re-education  Decreased function - therapeutic activities  STG/LTGs have been met or progress has been made towards goals:  Yes (See Goal flow sheet completed today.)  Assessment of Progress: The patient's condition has potential to improve.  Self Management Plans:  Patient has been instructed in a home treatment program.  Patient  has been instructed in self  management of symptoms.    Laurie continues to require the following intervention to meet STG and LTG's:  PT    Recommendations:  Pt to cont to seek further assessment of condition.  To return to PT PRN.    Please refer to the daily flowsheet for treatment today, total treatment time and time spent performing 1:1 timed codes.

## 2017-11-27 ENCOUNTER — THERAPY VISIT (OUTPATIENT)
Dept: PHYSICAL THERAPY | Facility: CLINIC | Age: 47
End: 2017-11-27
Payer: COMMERCIAL

## 2017-11-27 DIAGNOSIS — M48.062 SPINAL STENOSIS OF LUMBAR REGION WITH NEUROGENIC CLAUDICATION: ICD-10-CM

## 2017-11-27 PROCEDURE — 97530 THERAPEUTIC ACTIVITIES: CPT | Mod: GP | Performed by: PHYSICAL THERAPIST

## 2017-11-27 PROCEDURE — 97110 THERAPEUTIC EXERCISES: CPT | Mod: GP | Performed by: PHYSICAL THERAPIST

## 2017-12-18 ENCOUNTER — THERAPY VISIT (OUTPATIENT)
Dept: PHYSICAL THERAPY | Facility: CLINIC | Age: 47
End: 2017-12-18
Payer: COMMERCIAL

## 2017-12-18 DIAGNOSIS — M48.062 SPINAL STENOSIS OF LUMBAR REGION WITH NEUROGENIC CLAUDICATION: ICD-10-CM

## 2017-12-18 PROCEDURE — 97110 THERAPEUTIC EXERCISES: CPT | Mod: GP | Performed by: PHYSICAL THERAPIST

## 2017-12-18 PROCEDURE — 97112 NEUROMUSCULAR REEDUCATION: CPT | Mod: GP | Performed by: PHYSICAL THERAPIST

## 2018-01-05 ENCOUNTER — THERAPY VISIT (OUTPATIENT)
Dept: PHYSICAL THERAPY | Facility: CLINIC | Age: 48
End: 2018-01-05
Payer: COMMERCIAL

## 2018-01-05 DIAGNOSIS — M48.062 SPINAL STENOSIS OF LUMBAR REGION WITH NEUROGENIC CLAUDICATION: ICD-10-CM

## 2018-01-05 PROCEDURE — 97110 THERAPEUTIC EXERCISES: CPT | Mod: GP | Performed by: PHYSICAL THERAPIST

## 2018-01-05 PROCEDURE — 97112 NEUROMUSCULAR REEDUCATION: CPT | Mod: GP | Performed by: PHYSICAL THERAPIST

## 2018-01-05 NOTE — PROGRESS NOTES
Subjective:  HPI                    Objective:  System    Physical Exam    General     ROS    Assessment/Plan:    PROGRESS  REPORT    Progress reporting period is from 11/3/17 to 1/5/18.       SUBJECTIVE  Subjective changes noted by patient:  .  Subjective: Pt reports seeing an MD that ordered an MRI to her hip.  MRI shoed a 1 cm labrum tear.  Has yet to return to MD to discuss further paln.    Current pain level is   .     Previous pain level was   Initial Pain level: 8/10.   Changes in function:  None  Adverse reaction to treatment or activity: None    OBJECTIVE  Changes noted in objective findings:    Objective: Re-assessed.  Pt does exhibit (+) FADIR and Cervantes tests for labrum tear on the right.  Gait still antlagic.  Discussed how to modify ex in light of MRI findings.  See flow.     ASSESSMENT/PLAN  Updated problem list and treatment plan: Diagnosis 1:  Lumbar stenosis  Pain -  manual therapy, self management, education and home program  Decreased ROM/flexibility - manual therapy and therapeutic exercise  Decreased joint mobility - manual therapy and therapeutic exercise  Decreased strength - therapeutic exercise and therapeutic activities  Impaired muscle performance - neuro re-education  Decreased function - therapeutic activities  Impaired posture - neuro re-education  STG/LTGs have been met or progress has been made towards goals:  Yes (See Goal flow sheet completed today.)  Assessment of Progress: The patient's condition has potential to improve.  Self Management Plans:  Patient has been instructed in a home treatment program.  Patient  has been instructed in self management of symptoms.    Laurie continues to require the following intervention to meet STG and LTG's:  PT    Recommendations:  This patient would benefit from continued therapy.     Frequency:  1 X week, once daily  Duration:  for 4 weeks          Please refer to the daily flowsheet for treatment today, total treatment time and time  spent performing 1:1 timed codes.

## 2018-01-05 NOTE — LETTER
[facility]THLETIC [facility] PHYSICAL [facility]  [address]  [facility] [address]  [phone]    2018    Re: Laurie Pradhan   :   1970  MRN:  [id number]   REFERRING PHYSICIAN:   Chloe Encarnacion    [facility]TH[facility]IC [facility] PHYSICAL [facility]  Date of Initial Evaluation:  10/02/2017  Visits:  7 Rxs Used: 7  Reason for Referral:  Spinal stenosis of lumbar region with neurogenic claudication  PROGRESS  REPORT  Progress reporting period is from 11/3/17 to 18.     SUBJECTIVE  Subjective changes noted by patient:  .  Subjective: Pt reports seeing an MD that ordered an MRI to her hip.  MRI shoed a 1 cm labrum tear.  Has yet to return to MD to discuss further paln.    Current pain level is   .     Previous pain level was   Initial Pain level: 8/10.   Changes in function:  None  Adverse reaction to treatment or activity: None  OBJECTIVE  Changes noted in objective findings:    Objective: Re-assessed.  Pt does exhibit (+) FADIR and Cervantes tests for labrum tear on the right.  Gait still antlagic.  Discussed how to modify ex in light of MRI findings.  See flow.   ASSESSMENT/PLAN  Updated problem list and treatment plan: Diagnosis 1:  Lumbar stenosis  Pain -  manual therapy, self management, education and home program  Decreased ROM/flexibility - manual therapy and therapeutic exercise  Decreased joint mobility - manual therapy and therapeutic exercise  Decreased strength - therapeutic exercise and therapeutic activities  Impaired muscle performance - neuro re-education  Decreased function - therapeutic activities  Impaired posture - neuro re-education  STG/LTGs have been met or progress has been made towards goals:  Yes (See Goal flow sheet completed today.)  Assessment of Progress: The patient's condition has potential to improve.  Self Management Plans:  Patient has been instructed in a home treatment program.  Patient  has been instructed in self  management of symptoms.  Laurie continues to require the following intervention to meet STG and LTG's:  PT  Recommendations:  This patient would benefit from continued therapy.     Frequency:  1 X week, once daily  Duration:  for 4 weeks    Thank you for your referral.        Re: Laurie Pradhan   :   1970    INQUIRIES  Therapist: Kira Way PT, ScD, [facility]   [facility]  [address] [place] [address] [address] [address]  Phone: [phone]  Fax: [phone]

## 2018-01-15 ENCOUNTER — THERAPY VISIT (OUTPATIENT)
Dept: PHYSICAL THERAPY | Facility: CLINIC | Age: 48
End: 2018-01-15
Payer: COMMERCIAL

## 2018-01-15 DIAGNOSIS — M48.062 SPINAL STENOSIS OF LUMBAR REGION WITH NEUROGENIC CLAUDICATION: ICD-10-CM

## 2018-01-15 PROCEDURE — 97110 THERAPEUTIC EXERCISES: CPT | Mod: GP | Performed by: PHYSICAL THERAPIST

## 2018-01-15 PROCEDURE — 97112 NEUROMUSCULAR REEDUCATION: CPT | Mod: GP | Performed by: PHYSICAL THERAPIST

## 2018-01-29 ENCOUNTER — THERAPY VISIT (OUTPATIENT)
Dept: PHYSICAL THERAPY | Facility: CLINIC | Age: 48
End: 2018-01-29
Payer: COMMERCIAL

## 2018-01-29 DIAGNOSIS — M48.062 SPINAL STENOSIS OF LUMBAR REGION WITH NEUROGENIC CLAUDICATION: ICD-10-CM

## 2018-01-29 PROCEDURE — 97140 MANUAL THERAPY 1/> REGIONS: CPT | Mod: GP | Performed by: PHYSICAL THERAPIST

## 2018-01-29 PROCEDURE — 97110 THERAPEUTIC EXERCISES: CPT | Mod: GP | Performed by: PHYSICAL THERAPIST

## 2018-01-29 NOTE — LETTER
"[facility] ATHLETIC [facility]YSICAL [facility]AP[facility]  [address]  [facility] [address]  [phone]  2018  Re: Laurie Pradhan   :   1970  MRN:  [id number]   REFERRING PHYSICIAN:   Chloe Encarnacion    [facility]LET[facility] PHYSICAL [facility]  Date of Initial Evaluation:  2018  Visits: 9 Rxs Used: 9  Reason for Referral:  Spinal stenosis of lumbar region with neurogenic claudication    PROGRESS  REPORT  Progress reporting period is from 10/2/17 to 18.       SUBJECTIVE  Subjective changes noted by patient:  .  Subjective: Pt states that she has suffered an exacerbation of sx after cross country skiing on flat ground over the weekend.  Currently offering complaints fo \"glass\" pain yessica behind right knee with walking.  Also still noting left sided pain.  Was seen by Dr. Metz at [facility] who has ordered an injection for her right hip and PT at [facility] for her hip.  Was seen 1x so far.    Current pain level is   .     Previous pain level was   Initial Pain level: 8/10.   Changes in function:  None  Adverse reaction to treatment or activity: None    OBJECTIVE  Changes noted in objective findings:    Objective: Stepped back one more time.  Pt brought in previous MRI to t-sp that revealed 2 lower disc issues on the left.  Clinical testing of thoracic spine revealed loss of thoracic rotation with (+/-) Carlo on the left.  Improved with axial sep to left t-sp.  Right posterior knee pain reporduced with ankle DF in supine.  Tender midline posterior right knee.  May be related to loss of neural mobility in tibial nerve at the knee.  Trial fo release with mvt.  Right hip mildly symtomatic for labral lesion.  Most pain with ankle DF in supine.     ASSESSMENT/PLAN  Updated problem list and treatment plan: Diagnosis 1:  Lumbar stensosi/L hip labral lesion.  Pain -  manual therapy, self management, education and home program  Decreased ROM/flexibility - manual therapy " and therapeutic exercise  Decreased joint mobility - manual therapy and therapeutic exercise  Decreased strength - therapeutic exercise and therapeutic activities  Impaired gait - gait training  Impaired muscle performance - neuro re-education  Decreased function - therapeutic activities  STG/LTGs have been met or progress has been made towards goals:  Yes (See Goal flow sheet completed today.)  Assessment of Progress: The patient's condition has potential to improve.  Self Management Plans:  Patient has been instructed in a home treatment program.  Patient  has been instructed in self management of symptoms.    Re: Laurie Pradhan   :   1970      Laurie continues to require the following intervention to meet STG and LTG's:  PT    Recommendations:  Recommend that patient cont working with only one PT to avoid conflicting treatment.          Thank you for your referral.    INQUIRIES  Therapist: Kira Way, PT, ScD, [facility] - [place] [facility]ICAL [facility]AP[facility]  [address] [place], [place] [id number] [address]  Phone: [phone]  Fax: [phone]

## 2018-01-29 NOTE — PROGRESS NOTES
"Subjective:  HPI                    Objective:  System    Physical Exam    General     ROS    Assessment/Plan:    PROGRESS  REPORT    Progress reporting period is from 1/5/18 to 1/29/18.       SUBJECTIVE  Subjective changes noted by patient:  .  Subjective: Pt states that she has suffered an exacerbation of sx after cross country skiing on flat ground over the weekend.  Currently offering complaints fo \"glass\" pain yessica behind right knee with walking.  Also still noting left sided pain.  Was seen by Dr. Metz at [facility] who has ordered an injection for her right hip and PT at [facility] for her hip.  Was seen 1x so far.    Current pain level is   .     Previous pain level was   Initial Pain level: 8/10.   Changes in function:  None  Adverse reaction to treatment or activity: None    OBJECTIVE  Changes noted in objective findings:    Objective: Stepped back one more time.  Pt brought in previous MRI to t-sp that revealed 2 lower disc issues on the left.  Clinical testing of thoracic spine revealed loss of thoracic rotation with (+/-) Carlo on the left.  Improved with axial sep to left t-sp.  Right posterior knee pain reporduced with ankle DF in supine.  Tender midline posterior right knee.  May be related to loss of neural mobility in tibial nerve at the knee.  Trial fo release with mvt.  Right hip mildly symtomatic for labral lesion.  Most pain with ankle DF in supine.     ASSESSMENT/PLAN  Updated problem list and treatment plan: Diagnosis 1:  Lumbar stensosi/L hip labral lesion.  Pain -  manual therapy, self management, education and home program  Decreased ROM/flexibility - manual therapy and therapeutic exercise  Decreased joint mobility - manual therapy and therapeutic exercise  Decreased strength - therapeutic exercise and therapeutic activities  Impaired gait - gait training  Impaired muscle performance - neuro re-education  Decreased function - therapeutic activities  STG/LTGs have been met or progress has been " made towards goals:  Yes (See Goal flow sheet completed today.)  Assessment of Progress: The patient's condition has potential to improve.  Self Management Plans:  Patient has been instructed in a home treatment program.  Patient  has been instructed in self management of symptoms.    Laurie continues to require the following intervention to meet STG and LTG's:  PT    Recommendations:  Recommend that patient cont working with only one PT to avoid conflicting treatment.      Please refer to the daily flowsheet for treatment today, total treatment time and time spent performing 1:1 timed codes.

## 2019-07-08 ENCOUNTER — TRANSFERRED RECORDS (OUTPATIENT)
Dept: HEALTH INFORMATION MANAGEMENT | Facility: CLINIC | Age: 49
End: 2019-07-08

## 2019-10-02 ENCOUNTER — TRANSFERRED RECORDS (OUTPATIENT)
Dept: HEALTH INFORMATION MANAGEMENT | Facility: CLINIC | Age: 49
End: 2019-10-02

## 2019-11-06 ENCOUNTER — HEALTH MAINTENANCE LETTER (OUTPATIENT)
Age: 49
End: 2019-11-06

## 2020-11-29 ENCOUNTER — HEALTH MAINTENANCE LETTER (OUTPATIENT)
Age: 50
End: 2020-11-29

## 2021-02-09 NOTE — TELEPHONE ENCOUNTER
Records received from [address] [place] [address] [facility].   Included  Office notes: 5/22/17, 5/25/17, 5/30/17, 5/31/17, 6/28/17, 7/3/17       See Qi's response, below. Thank you.

## 2021-02-16 ENCOUNTER — TRANSFERRED RECORDS (OUTPATIENT)
Dept: HEALTH INFORMATION MANAGEMENT | Facility: CLINIC | Age: 51
End: 2021-02-16

## 2021-08-18 ENCOUNTER — TRANSFERRED RECORDS (OUTPATIENT)
Dept: HEALTH INFORMATION MANAGEMENT | Facility: CLINIC | Age: 51
End: 2021-08-18

## 2021-08-23 ENCOUNTER — MEDICAL CORRESPONDENCE (OUTPATIENT)
Dept: HEALTH INFORMATION MANAGEMENT | Facility: CLINIC | Age: 51
End: 2021-08-23

## 2021-08-24 ENCOUNTER — TRANSCRIBE ORDERS (OUTPATIENT)
Dept: OTHER | Age: 51
End: 2021-08-24

## 2021-08-24 DIAGNOSIS — M50.320 DEGENERATION OF INTERVERTEBRAL DISC OF MID-CERVICAL REGION: ICD-10-CM

## 2021-08-24 DIAGNOSIS — R25.1 TREMOR: Primary | ICD-10-CM

## 2021-08-24 DIAGNOSIS — G98.8 NEUROLOGIC DISORDER: ICD-10-CM

## 2021-09-01 NOTE — TELEPHONE ENCOUNTER
RECORDS RECEIVED FROM: External   REASON FOR VISIT: tremors   Date of Appt: 10/1/21   NOTES (FOR ALL VISITS) STATUS DETAILS   OFFICE NOTE from referring provider Received Dr Gladys Akins @ [facility]:  8/18/21 7/19/21   OFFICE NOTE from other specialist Care Everywhere Dr Diamante Jiang @ [facility]:  5/28/21    Elba Aguilar NP @ [facility]:  2/9/21    Dr Nelly Marie @ [facility]:  9/25/19    Dr Candida Toledo @ [facility]:  4/12/17   DISCHARGE SUMMARY from hospital N/A    DISCHARGE REPORT from the ER N/A    OPERATIVE REPORT N/A    MEDICATION LIST Care Everywhere    IMAGING  (FOR ALL VISITS)     EMG Received [facility]:  9/28/17   EEG N/A    LUMBAR PUNCTURE N/A    TICO SCAN N/A    ULTRASOUND (CAROTID BILAT) *VASCULAR* N/A    MRI (HEAD, NECK, SPINE) Received [facility]:  MRI Cervical Spine 2/16/21  MRI Brain 2/16/21  MRI Cervical Spine 10/2/19  MRI Brain 7/8/19  MRI Cervical Spine 9/28/17  MRI Lumbar Spine 9/7/17   CT (HEAD, NECK, SPINE) N/A       Action 9/1/21 MV 1.15pm   Action Taken Imaging request faxed to [facility]    Images resolved in PACS. Reports sent to scanning 9/3/21 MV 2.24pm

## 2021-09-19 ENCOUNTER — HEALTH MAINTENANCE LETTER (OUTPATIENT)
Age: 51
End: 2021-09-19

## 2021-10-01 ENCOUNTER — PRE VISIT (OUTPATIENT)
Dept: NEUROLOGY | Facility: CLINIC | Age: 51
End: 2021-10-01

## 2021-10-01 ENCOUNTER — OFFICE VISIT (OUTPATIENT)
Dept: NEUROLOGY | Facility: CLINIC | Age: 51
End: 2021-10-01
Attending: FAMILY MEDICINE
Payer: COMMERCIAL

## 2021-10-01 VITALS
BODY MASS INDEX: 20.98 KG/M2 | WEIGHT: 140 LBS | DIASTOLIC BLOOD PRESSURE: 82 MMHG | HEART RATE: 71 BPM | RESPIRATION RATE: 16 BRPM | SYSTOLIC BLOOD PRESSURE: 135 MMHG | OXYGEN SATURATION: 98 %

## 2021-10-01 DIAGNOSIS — R53.1 SUBJECTIVE WEAKNESS: ICD-10-CM

## 2021-10-01 DIAGNOSIS — M79.10 MYALGIA: Primary | ICD-10-CM

## 2021-10-01 DIAGNOSIS — R25.2 CRAMP OF LIMB: ICD-10-CM

## 2021-10-01 DIAGNOSIS — R25.1 TREMOR: ICD-10-CM

## 2021-10-01 PROCEDURE — 99417 PROLNG OP E/M EACH 15 MIN: CPT | Performed by: PSYCHIATRY & NEUROLOGY

## 2021-10-01 PROCEDURE — 99205 OFFICE O/P NEW HI 60 MIN: CPT | Mod: GC | Performed by: PSYCHIATRY & NEUROLOGY

## 2021-10-01 RX ORDER — FLUTICASONE PROPIONATE 50 MCG
SPRAY, SUSPENSION (ML) NASAL
COMMUNITY
Start: 2020-10-02

## 2021-10-01 RX ORDER — CLINDAMYCIN PHOSPHATE 10 UG/ML
LOTION TOPICAL
COMMUNITY
Start: 2020-10-15

## 2021-10-01 RX ORDER — BETAMETHASONE DIPROPIONATE 0.5 MG/ML
LOTION, AUGMENTED TOPICAL
COMMUNITY
Start: 2021-09-16

## 2021-10-01 ASSESSMENT — PAIN SCALES - GENERAL: PAINLEVEL: SEVERE PAIN (7)

## 2021-10-01 NOTE — PROGRESS NOTES
"Department of Neurology  Movement Disorders Division     Patient: Laurie Pradhan   MRN: [id number]   : 1970   Date of Visit: 10/1/21    Dear Colleague,     Thank you for referring your patient, Ms. Pradhan, to the [facility] NEUROLOGY at [facility]. Please see a copy of my visit note below.    Reason for visit: tremor  Referring Physician: Dr Gladys Akins @ [facility]    History of Present Illness  Ms. Pradhan is a 51 year old R handed female that presents to Neurology Movement clinic as a new patient for evaluation of tremor.     History obtained from patient.   Initial symptom/side:   Early  started having difficulty walking and thought it was neurogenic claudication, and experienced crampy/stabby pain in legs. This symptoms \"came out of the blue.\" Evaluated for vascular claudication and was negative. Had associated neck and back pain. Also has stiffness and pain in neck, arms and legs. Legs with cramping sharp pain. Has cramps nightly; started with cramps in calves and now in thighs. Cramping not worse with heat or cold exposure. Can't run because of weakness described as a rubbery feeling or pins and needles. She has jolts of pain/spasms. Pain is 9/10. Has a lot of cramping and spasming and reports they may cause her to pass out but has never passed out. She reports \"lifelong calf pain\" that she was told was fibromyalgia or tight calves. Having a harder time going up and down stairs and has to use the railing some times. No issues with getting out of bed or a low seated chair. Reports balance issues mostly when she is in the shower and eyes are closed. Denies falls or issues with turning.  Always feels a subtle vibration in neck/arms and legs. Reports having pins and needles in arms/hands and legs > right foot and right face.  No new meds, diets, supplements. No one in family with similar symptoms.  Hot baths, massages, yoga and finding a good way to sit " "and relax will help but doesn't stop symptoms. Soft chairs, going into yoga extension, and walking will make pain/spasms symptoms and leg pain worse. No issues developmentally as a child. But reports calf pain and soreness, starting in puberty. This calf pain was not always associated with activity. Has to wear tight clothing to \"feel like I am being kept together\" and described that she feels the pain is worse if she wears baggy clothes. No issues with swallowing or breathing. Has not been ill. Feels more foggy and forgetful. No issues with paying bills, getting lost in familiar places. She feels frustrated and depressed about what's happening but overall she feels good. She has a good support system with partner, friends, therapist. She is a vegetarian. She was moving 2017 around the time symptoms started but no other life stressor or major life events. Now can't do yoga as much due to pain and cramps; she can't hold positions and fatigues more easily. She also reports mottling in legs and arms which is new.    She reports her back \"going out\" after having her son around 14 years ago. She has a lot of pain in this area currently.      Tremors occurs early in the am when she stretches. Legs and arms get rubbery and weak and then she notices her hands start to shake. When doing the yoga pose, she notices her arm will shake more. She had injections in both shoulders due to \"frozen shoulder\" which improved.     Disease progression: Neck and arm are worse in regards to pain (spasm, dull ache, sharpness) and weakness and less ROM.     Sleep: Cramps wake her up and keep her up, lasting 10 minutes with lingering symptoms.   Sense of smell: no issues  Constipation: A BM once every 3 days. Takes miralax daily.   Speech: no issues  Facial expression: no issues  Swallowing: no issues  Autonomic dysfunction: no issues  Family Hx of tremor: no issues  Takes adderall for ADHD and depression.    Review of Systems:  Other than " that mentioned above, the remainder of 12 systems reviewed were negative.    Past Medical History:   Diagnosis Date     Allergic rhinitis due to other allergen     seasonal     Attention deficit disorder without mention of hyperactivity     follows with Dr. Monika Whittaker- psychiatrist at [facility]     Back injury 2015    excruciating pain whenever I drove my Prius... injury?     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     Reduced vision     nearsightedness began, glasses/contacts     Surgical complication 2007    emergency ; very ill after     Past Surgical History:   Procedure Laterality Date     C STOMACH SURGERY PROCEDURE UNLISTED  2007    emergency c section     HC TOOTH EXTRACTION W/FORCEP       ZZC NONSPECIFIC PROCEDURE      removal of a cyst between bladder and uterus         Social History     Socioeconomic History     Marital status:      Spouse name: Not on file     Number of children: Not on file     Years of education: Not on file     Highest education level: Not on file   Occupational History     Occupation: free marc writer, clinical psychologist     Employer: [employer]   Tobacco Use     Smoking status: Former Smoker     Packs/day: 0.50     Years: 10.00     Pack years: 5.00     Types: Cigarettes     Smokeless tobacco: Never Used     Tobacco comment: 1 daily   Substance and Sexual Activity     Alcohol use: Yes     Comment: social     Drug use: No     Sexual activity: Yes     Partners: Male     Birth control/protection: Condom   Other Topics Concern     Not on file   Social History Narrative     Not on file     Social  Determinants of Health     Financial Resource Strain:      Difficulty of Paying Living Expenses:    Food Insecurity:      Worried About Running Out of Food in the Last Year:      Ran Out of Food in the Last Year:    Transportation Needs:      Lack of Transportation (Medical):      Lack of Transportation (Non-Medical):    Physical Activity:      Days of Exercise per Week:      Minutes of Exercise per Session:    Stress:      Feeling of Stress :    Social Connections:      Frequency of Communication with Friends and Family:      Frequency of Social Gatherings with Friends and Family:      Attends Bahai Services:      Active Member of Clubs or Organizations:      Attends Club or Organization Meetings:      Marital Status:    Intimate Partner Violence:      Fear of Current or Ex-Partner:      Emotionally Abused:      Physically Abused:      Sexually Abused:      SH:   -Parents/Family/Living situation: Lives in a home in [place] with partner and son part of the time.   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  -Tobacco: stopped smoking 14 years ago, smoked 1-1/2  Ppd starting at 19 years old  -Alcohol: not much  -Illicit substances: pot when she was younger     Medications:  Current Outpatient Medications   Medication Sig Dispense Refill     ACETAMINOPHEN PO Take 1,000 mg by mouth daily        amphetamine-dextroamphetamine (ADDERALL) 20 MG per tablet Take 1.5 tablet am, 1 tab at noon and 1/2 afternoon prn.----Next visit due in October       augmented betamethasone dipropionate (DIPROLENE) 0.05 % external lotion prn       Cholecalciferol (VITAMIN D3) 1000 UNITS CAPS Take 5,000 Units by mouth daily       clindamycin (CLEOCIN T) 1 % external lotion prn       cyclobenzaprine (FLEXERIL) 5 MG tablet Take 5 mg by mouth 2 times daily as needed       diclofenac (VOLTAREN) 1 % topical gel Place 2 g onto the skin       Fluocinolone Acetonide  (FLUOCINOLONE ACETAONIDE) 0.01 % oil Apply to scaling areas of the scalp, face, and ears at bedtime for five to eight hours.  Cover with shower cap.  Wash off in AM.       fluticasone (FLONASE) 50 MCG/ACT nasal spray        ketoconazole (NIZORAL) 2 % shampoo Apply topically twice a week       LORazepam (ATIVAN) 0.5 MG tablet        methocarbamol (ROBAXIN) 500 MG tablet Take 500 mg by mouth nightly as needed       traZODone (DESYREL) 50 MG tablet Take 50 mg by mouth At Bedtime       amphetamine-dextroamphetamine (ADDERALL XR) 15 MG per 24 hr capsule Take 15 mg by mouth (Patient not taking: Reported on 10/1/2021)       ibuprofen 200 MG capsule Take 400 mg by mouth every 6 hours as needed (Patient not taking: Reported on 10/1/2021)       PROZAC 20 MG OR CAPS 1 CAP PO QD (Once per day) in AM (Patient not taking: No sig reported) 30 1           Allergies   Allergen Reactions     Avelox Other (See Comments)     Nausea and peripheral neuropathy     Dilaudid [Hydromorphone] Itching     Guaifenesin & Derivatives Itching     Penicillin G          Physical Exam:  The patient's  weight is 63.5 kg (140 lb). Her blood pressure is 135/82 and her pulse is 71. Her respiration is 16 and oxygen saturation is 98%.    Physical Exam:  Gen: alert, active, attentive, appropriately groomed   HEENT: normocephalic, eyes open with no discharge, nares patent, oropharynx clear-no lesions, no bruits  Chest: normal configuration, chest rise equal b/l, non labored breathing   Extremities: no clubbing/edema/cyanosis in BUE/BLE, legs and arms mottled appearance   Psych: mood stable     Neuro:  Mental status: Oriented to person/place/time/situation  Memory: Recent and remote memory intact. Able to recall 3/3 words. Able to recall current president and past presidents until Jonathon.  Attention: Good span, follows conversation. Able to spell WORLD backwards.   Fund of knowledge: Appropriate to current events  Level of consciousness: Alert   Speech:     Fluency: Normal   Comprehension: Normal   Articulation: Normal    Repetition: Normal    Naming: Normal     Cranial Nerves:  II: Pupils equal and reactive, Visual Fields Intact  III, IV, VI: EOM normal range, no nystagmus.  V:  Facial sensation intact, jaw opening intact.  VII: No weakness on raising eyebrows, bilaterally, good smile, and eyelid closing.  VIII: Intact to conversation.  IX, X: Palate rise b/l, uvula midline.  XI: Shoulder shrug normal b/l  XII: Tongue protrudes midline. No fasciculation or atrophy noted.    Motor system:           Limb strength      R/L                          SA       5/5                          EE       5/5                          EF       5/5                          WE       5/5                          WF       5/5                          FE       5/5                          FF       5/5                          Daniella      5/5                          HF       5/5                          HAb      5/5                          HAd 5/5    KF       5/5                          KE       5/5                          ADF      5/5                          APF      5/5       No spasticity, flaccidity, or fasciculations   Normal axial and appendicular tone    Movement Disorders Assessment:  Facial Expression: Normal.  Rest Tremor (R; L): Absent.; Absent.  Rigidity (RUE; LUE): Absent.; Absent.. (RLE; LLE):Absent.; Absent..  Finger Taps (R; L): Normal.; Normal.  Hand opening & closing (R; L): Normal.; Normal.  Hand pronation & supination (R; L): Normal.; Normal.  Toe taps (R; L): Normal.; Normal.  Leg Agility (R; L): Normal.; Normal.  Arising from chair - arms folded across chest: Normal.  Posture: Normal erect.  Gait: Normal.  Body Bradykinesia: None.    Cerebellar Function:   Heel-to-shin: normal    Sensation:       Light touch: intact   Pinprick: intact    Vibration (tuning fork 256 Hz): intact    Romberg sign: absent   Temperature: normal     Reflexes:    DTR's           R/L       " Biceps      2/2       Triceps      2/2       Brachioradialis 2/2       Quadriceps   2/2       Achilles     2/2     Pathologic reflexes:       Plantar response: toes downgoing bilaterally   Clonus: absent      Data Reviewed: I have personally reviewed the tests/studies below.     MRI brain Febrv 2021 - stable small cerebellar cyst on the left side stable since 2005   MRI cervical spine February 2021 - no evidence of spinal stenosis. Mild age-related degenerative changes.  No high-grade foraminal narrowing. At any level  Images were reviewed on PACS    MRI lumbar 2019 - no high-grade spinal stenosis  Can be consider as expected for age with mild degenerative changes  MRI thoracic spine normal 2017    EMG 2017 - RLE - was tolerated with difficulty was a normal study with no evidence of neuropathy or radiculopathy.    2019 - TSH, B12, QUETA , BMP, ESR, CRP normal   2017 - labs for rheumatologic conditions have been negative    2021 TSH normal    Impression:  Laurie Pradhan is a 51 year old female with multiple complaints, mainly myalgias in legs and arms, with associated cramping. She rates pain from this as a 9/10 and has subjective weakness. She reports issues calf cramping b/l beginning in puberty. This is her biggest complaint. She also reports a \"pins and needles\" feeling in her arms and legs with an associated mottling appearance of her arms and legs. She is referred to Movement disorders clinic for tremors in her hands which she notices with action, mostly when she is performing yoga. Tremors do not interfere or impede with daily activities.  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 Do not suspect a movement disorder at this time, such as Parkinson disease or an atypical Parkinsonism.     Enhanced physiologic tremor   Myalgias  Cramps of limbs  Subjective weakness    Plan:   - Neuromuscular referral to evaluate for cramps and myalgias with subjective weakness and sensory " complaints.   - No other recommendations from a Movement Disorders perspective at this time.     Patient to return as needed, if tremors worsen.     Zehra Andrea DO, MA   of Neurology   [facility]     91 minutes spent on date of encounter doing chart reviews and exam and documentation and further activities as noted above.     Ms. Stephanie Chavez, MS-4, was present for this appointment for educational purposes.

## 2021-10-01 NOTE — NURSING NOTE
Chief Complaint   Patient presents with     Consult     P NEW MOVEMENT DISORDER     Eduardo Marlow

## 2021-10-06 NOTE — TELEPHONE ENCOUNTER
RECORDS RECEIVED FROM: Internal   REASON FOR VISIT: persistent and worsening myalgia and cramps   Date of Appt: 11/1/21   NOTES (FOR ALL VISITS) STATUS DETAILS   OFFICE NOTE from referring provider Internal Dr Andrea @ [facility]:  10/1/21   OFFICE NOTE from other specialist [facility] Dr Diamante Jiang @ [facility]:  5/28/21     Elba Aguilar NP @ [facility]:  2/9/21     Dr Nelly Marie @ [facility]:  9/25/19     Dr Candida oTledo @ [facility]:  4/12/17   DISCHARGE SUMMARY from hospital N/A    DISCHARGE REPORT from the ER N/A    OPERATIVE REPORT N/A    MEDICATION LIST Internal    IMAGING  (FOR ALL VISITS)     EMG [facility]:  9/28/17   EEG N/A    LUMBAR PUNCTURE N/A    TICO SCAN N/A    ULTRASOUND (CAROTID BILAT) *VASCULAR* N/A    MRI (HEAD, NECK, SPINE) Received [facility]:  MRI Cervical Spine 2/16/21  MRI Brain 2/16/21  MRI Cervical Spine 10/2/19  MRI Brain 7/8/19  MRI Cervical Spine 9/28/17  MRI Lumbar Spine 9/7/17   CT (HEAD, NECK, SPINE) N/A

## 2021-10-22 ENCOUNTER — TELEPHONE (OUTPATIENT)
Dept: NEUROLOGY | Facility: CLINIC | Age: 51
End: 2021-10-22

## 2021-10-22 NOTE — TELEPHONE ENCOUNTER
[facility] Call Center    Phone Message    May a detailed message be left on voicemail: yes     Reason for Call: Other: Please fax referral for neurology to [facility] [phone] per patient request for insurance approval. Thank you.     Action Taken: Message routed to:  [facility]y    Travel Screening: Not Applicable

## 2021-10-29 ENCOUNTER — TELEPHONE (OUTPATIENT)
Dept: NEUROLOGY | Facility: CLINIC | Age: 51
End: 2021-10-29

## 2021-10-29 NOTE — TELEPHONE ENCOUNTER
Patient was called and message was left on patient VM letting her know to call primary care provider to get a neurology referral, clinic number was left if patient has more questions for staff

## 2021-10-29 NOTE — TELEPHONE ENCOUNTER
[facility] Call Center    Phone Message    May a detailed message be left on voicemail: yes     Reason for Call: Other: Patient is requesting a call back to discuss asking her primary care doctor to order referral for neurology, patient insurance need for neurology referral to come from [facility] primary care doctor before Mondays appointment. Please call patient at [phone] to advise, patient will be available at 1pm.      Action Taken: Message routed to:  [facility]    Travel Screening: Not Applicable

## 2021-11-01 ENCOUNTER — OFFICE VISIT (OUTPATIENT)
Dept: NEUROLOGY | Facility: CLINIC | Age: 51
End: 2021-11-01
Attending: PSYCHIATRY & NEUROLOGY
Payer: COMMERCIAL

## 2021-11-01 ENCOUNTER — PRE VISIT (OUTPATIENT)
Dept: NEUROLOGY | Facility: CLINIC | Age: 51
End: 2021-11-01

## 2021-11-01 VITALS
SYSTOLIC BLOOD PRESSURE: 128 MMHG | OXYGEN SATURATION: 99 % | DIASTOLIC BLOOD PRESSURE: 80 MMHG | HEART RATE: 76 BPM | RESPIRATION RATE: 16 BRPM

## 2021-11-01 DIAGNOSIS — R25.1 TREMOR: ICD-10-CM

## 2021-11-01 DIAGNOSIS — M79.10 MYALGIA: ICD-10-CM

## 2021-11-01 DIAGNOSIS — R53.1 SUBJECTIVE WEAKNESS: ICD-10-CM

## 2021-11-01 DIAGNOSIS — R25.2 CRAMP OF LIMB: ICD-10-CM

## 2021-11-01 PROCEDURE — 99214 OFFICE O/P EST MOD 30 MIN: CPT | Performed by: PSYCHIATRY & NEUROLOGY

## 2021-11-01 ASSESSMENT — PAIN SCALES - GENERAL: PAINLEVEL: SEVERE PAIN (7)

## 2021-11-01 NOTE — PATIENT INSTRUCTIONS
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  Since this has been present since childhood and is not limited your activities, it is very unlikely that you have a more severe muscle disease.  Your prior nerve conduction, EMG, and multiple laboratory tests have been negative so we will not repeat these at this time.  Doing a more invasive test such as a muscle biopsy is not recommended based on your normal exams up until this point.  For symptomatic control lead to recommend optimizing medications that can help to prevent cramps such as your Robaxin or switching to tizanidine.  Using this on a regular basis, instead of sporadically might help your symptoms.   Feel free to call or message if questions come up.

## 2021-11-01 NOTE — PROGRESS NOTES
"Chief Complaint: Muscle pain and cramping    History of Present Illness:    Laurie Pradhan is a 51 year old year old woman who we are seeing at the request of Dr. Andrea for evaluation of cramps, myalgias and subjective weakness. 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 Because her symptoms also include difficulty walking due to cramps and pain in the thighs and calves starting in puberty, fatigue in her limbs while doing exercises (yoga) that she used to be able to perform, and a pins and needles sensation in her arms and legs she was referred to the neuromuscular clinic for further evaluation. She has been seen in neurology in the past, most recently by Dr. Latif 05/2021 with a normal exam documented at the time and a suspicion higher for musculoskeletal pain and possible fibromyalgia.     Laurie describes having leg pain \"as far back as I can remember.\"  The pain has typically been focused in her bilateral calves though over the past couple of years has spread to include her bilateral hamstrings, right worse than left, as well as her shoulders and arms.  The pain is focused in the bilateral calves, sometimes causes the muscle to feel very firm, and can cause her foot to dorsiflex though many times she has pain without the muscle becoming stiff or without locking the joint in place.  The distribution includes the hamstring muscles especially closer to the insertion at the knees.  This pain peaked about 4-1/2 years ago during a time when there was additional stress and she was moving from a condominium to her house.  At its worst she described using canes or walking sticks in both hands, though this is actually improved since then.  The pain is worsened when walking and can happen immediately upon walking, though is sometimes delayed.  The pain can also " "be precipitated even if she is sitting and doing nothing active.  It is associated with low back pain, and the leg pain is worse if the back pain is worse.    She grew up in [place] \"playing soccer 24/7\" from the age of about [age].  She describes not having any difficulty keeping up with the other kids, or having any second wind type symptoms, though she did always have some kind of pain in her feet or her legs.  She denies any history of rhabdomyolysis, any dark or cola colored urine.  She was a runner from age 16 to about 35, running 4 to 6 miles a day, again always with pain, though without any annabelle weakness or exercise intolerance other than generalized pains.  She did have restless leg symptoms in the past, though she has not had these symptoms for many years.  She denies irresistible urge to move when trying to sleep, or any other time during the day.  She does not have any burning or tingling of the hands or feet, or any hyperesthetic sensitivities to touch.    She has been getting some relief from using cannabis, volataren gel, and robaxin.  Depending on her symptoms she uses the Robaxin and cannabis up to a couple of times a week, though she describes that she can also several weeks without using these.  She has been offered gabapentin in the past, though does not like the way the medication feels, and does not like medications of this class in general.    She denies any difficulty eating, swallowing any blurred or double vision, any neck weakness, any shortness of breath at rest or with activity. She denies hypesthesias, parashesias, dysesthesias, or allodynia.  She has not experienced changes in sweating, anhidrosis, lightheadedness, syncope, or early satiety and has not noticed changes in bowel or bladder function. She denies frequent cramps, fasciculations, or difficulty with muscle stiffness and muscle relaxation.  Mood and affect are appropriate. She is independent, has no assistive " devices, able to ambulate independently, and is not falling.    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 Mild age-related degenerative changes.  No high-grade foraminal narrowing. At any level  Images were reviewed on PACS    MRI lumbar  - no high-grade spinal stenosis  Can be consider as expected for age with mild degenerative changes  MRI thoracic spine normal       Prior electrophysiologic work-up:  Nerve conduction studies/EMG       Past Medical History:   Past Medical History:   Diagnosis Date     Allergic rhinitis due to other allergen     seasonal     Attention deficit disorder without mention of hyperactivity     follows with Dr. Monika Whittaker- psychiatrist at [facility]     Back injury 2015    excruciating pain whenever I drove my Prius... injury?     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     Reduced vision     nearsightedness began, glasses/contacts     Surgical complication 2007    emergency ; very ill after         Past Surgical History:  Past Surgical History:   Procedure Laterality Date     C STOMACH SURGERY PROCEDURE UNLISTED  2007    emergency c section     HC TOOTH EXTRACTION W/FORCEP       ZZC NONSPECIFIC PROCEDURE       removal of a cyst between bladder and uterus       Family history:    There is no known family history of hereditary neuropathies or other neuromuscular disorders.  Brother, healthy, age 53  Father-prostate ca. and  of AAA  Mother is alive healthy     Social History:    She denies tobacco, alcohol, or illicit drug use. There is no known exposure to toxins or heavy metals.     Medical Allergies:  NKDA     Current Medications:      Review of Systems: A complete review of systems was obtained and was negative except for what was noted above.    Physical examination:    /80   Pulse 76   Resp 16   SpO2 99%      General Appearance: NAD    Skin: There are no rashes or other skin lesions.    Musculoskeletal:  There is no scoliosis, lordosis, kyphosis, pes cavus, or hammertoes.    Neurologic examination:    Mental status:  Patient is alert, attentive, and oriented x 3.  Language is coherent and fluent without  aphasia.  Memory, comprehension and ability to follow commands were intact.       Cranial nerves:   Pupils were round and reacted to light.  Extraocular movements were full. There was no face, jaw, palate or tongue weakness or atrophy. Hearing was grossly intact.  Shoulder shrug was normal.       Motor exam: No atrophy or fasciculations.   Manual muscle testing revealed the following MRC grade muscle power:   Right Left   Neck flexion 5    Neck extension: 5    Shoulder abduction:  5 5   Elbow extension: 5 5   Elbow flexion:  5 5   Wrist flexion:  5 5   APB 5 5   Finger extension:  5 5   FDI 5 5   Hip extension 5 5   Knee flexion 5 5   Knee extension 5 5   Dorsiflexion 5 5   Plantar flexion 5 5     Complex motor skills: No tremor or ataxia    Sensory exam revealed normal perception of vibration, proprioception, light touch, pin, and temperature proximally and distally in the arms and legs bilaterally. Romberg sign was absent.    Sensory exam revealed decreased vibratory perception in the toes  bilaterally. Proprioception was intact. Pinprick and temperature were decreased to the ankles bilaterally.  Light touch was normal.  Romberg sign was absent.       Gait: Narrow and stable.  She was able to walk on his heels, toes and tandem without any difficulty.       Deep tendon reflexes:   Right Left   Triceps 2 2   Biceps 2 2   Brachioradialis 2 2   Knee jerk 2 2   Ankle jerk 2 2   Plantar responses were flexor bilaterally.       Assessment:      Laurie is a 51-year-old woman who comes to us with the concern of pain and cramping in the lower extremities greater than the upper extremities for many years. Her symptoms have been present since she was a child, in terms of some pain and cramping, though this did not at all interfering with her physical abilities and in fact she has always been quite physically active.  Labs have shown a normal CK, normal QUETA antibody, EMG/NCS and multiple normal neurologic exams including today.  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  The duration of her symptoms is reassuring, and her degree of symptoms is actually improved over the past several years.  I explained that taking muscle relaxant such as Robaxin or trying tizanidine on a more regular basis may help her symptoms, rather than the more sporadic dosage she is now taking.  With her normal exams and prior testing, the yield of performing additional tests such as muscle biopsy, MRI, or skin biopsy for small fiber neuropathy would be, I suspect, extremely low, so I would not recommend them.    Plan:      - Consider taking Robaxin, baclofen or tizanidine on a regular basis, for symptomatic control  -Please call or message if questions arise     Chapincito Damian MD  Neurophysiology fellow    This patient was seen and discussed with   Danielle who agrees with the assessment and plan above      ATTENDING ADDENDUM: Patient seen and examined with Fellow Dr Damian at the [facility] today. Agree with his assessment and plan. TT spent on this encounter today 35 minutes, of which 15 face to face with patient,10 in post-visit note review and editing, and 10 in pre-visit chart review. Mrs Pradhan has very longstanding muscle cramps. Her neurological examination is normal; she also has no myotonia. Previous EMG in 2017 was unremarkable. CK was normal as well twice in the last 10 years (<100). No family history of similar condition and no history of exercise intolerance or rhabdomyolysis in early life. It is very unlikely that further neuromuscular evaluation will be productive in this scenario. Recommend symptomatic management as above and follow up as needed. Lorenzo Santos MD

## 2021-11-01 NOTE — LETTER
"11/1/2021       RE: Laurie Pradhan  [address] [place] [address]     Dear Colleague,    Thank you for referring your patient, Laurie Pradhan, to the [facility] [place] [facility]. Please see a copy of my visit note below.    Chief Complaint: Muscle pain and cramping    History of Present Illness:    Laurie Pradhan is a 51 year old year old woman who we are seeing at the request of Dr. Andrea for evaluation of cramps, myalgias and subjective weakness. 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 Because her symptoms also include difficulty walking due to cramps and pain in the thighs and calves starting in puberty, fatigue in her limbs while doing exercises (yoga) that she used to be able to perform, and a pins and needles sensation in her arms and legs she was referred to the neuromuscular clinic for further evaluation. She has been seen in neurology in the past, most recently by Dr. Latif 05/2021 with a normal exam documented at the time and a suspicion higher for musculoskeletal pain and possible fibromyalgia.     Laurie describes having leg pain \"as far back as I can remember.\"  The pain has typically been focused in her bilateral calves though over the past couple of years has spread to include her bilateral hamstrings, right worse than left, as well as her shoulders and arms.  The pain is focused in the bilateral calves, sometimes causes the muscle to feel very firm, and can cause her foot to dorsiflex though many times she has pain without the muscle becoming stiff or without locking the joint in place.  The distribution includes the hamstring muscles especially closer to the insertion at the knees.  This pain peaked about 4-1/2 years ago during a " "time when there was additional stress and she was moving from a condominium to her house.  At its worst she described using canes or walking sticks in both hands, though this is actually improved since then.  The pain is worsened when walking and can happen immediately upon walking, though is sometimes delayed.  The pain can also be precipitated even if she is sitting and doing nothing active.  It is associated with low back pain, and the leg pain is worse if the back pain is worse.    She grew up in [facility] [facility] \"playing soccer 24/7\" from the age of about [age].  She describes not having any difficulty keeping up with the other kids, or having any second wind type symptoms, though she did always have some kind of pain in her feet or her legs.  She denies any history of rhabdomyolysis, any dark or cola colored urine.  She was a runner from age 16 to about 35, running 4 to 6 miles a day, again always with pain, though without any annabelle weakness or exercise intolerance other than generalized pains.  She did have restless leg symptoms in the past, though she has not had these symptoms for many years.  She denies irresistible urge to move when trying to sleep, or any other time during the day.  She does not have any burning or tingling of the hands or feet, or any hyperesthetic sensitivities to touch.    She has been getting some relief from using cannabis, volataren gel, and robaxin.  Depending on her symptoms she uses the Robaxin and cannabis up to a couple of times a week, though she describes that she can also several weeks without using these.  She has been offered gabapentin in the past, though does not like the way the medication feels, and does not like medications of this class in general.    She denies any difficulty eating, swallowing any blurred or double vision, any neck weakness, any shortness of breath at rest or with activity. She denies hypesthesias, parashesias, dysesthesias, or allodynia.  She " has not experienced changes in sweating, anhidrosis, lightheadedness, syncope, or early satiety and has not noticed changes in bowel or bladder function. She denies frequent cramps, fasciculations, or difficulty with muscle stiffness and muscle relaxation.  Mood and affect are appropriate. She is independent, has no assistive devices, able to ambulate independently, and is not falling.    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 Mild age-related degenerative changes.  No high-grade foraminal narrowing. At any level  Images were reviewed on PACS    MRI lumbar 2019 - no high-grade spinal stenosis  Can be consider as expected for age with mild degenerative changes  MRI thoracic spine normal 2017      Prior electrophysiologic work-up:  Nerve conduction studies/EMG       Past Medical History:   Past Medical History:   Diagnosis Date     Allergic rhinitis due to other allergen     seasonal     Attention deficit disorder without mention of hyperactivity     follows with Dr. Monika Whittaker- psychiatrist at [place]     Back injury March 2015    excruciating pain whenever I drove my Prius... injury?     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     Reduced vision 1987    nearsightedness began,  glasses/contacts     Surgical complication 2007    emergency ; very ill after         Past Surgical History:  Past Surgical History:   Procedure Laterality Date     C STOMACH SURGERY PROCEDURE UNLISTED  2007    emergency c section     HC TOOTH EXTRACTION W/FORCEP       ZZC NONSPECIFIC PROCEDURE      removal of a cyst between bladder and uterus       Family history:    There is no known family history of hereditary neuropathies or other neuromuscular disorders.  Brother, healthy, age 53  Father-prostate ca. and  of AAA  Mother is alive healthy     Social History:    She denies tobacco, alcohol, or illicit drug use. There is no known exposure to toxins or heavy metals.     Medical Allergies:  NKDA     Current Medications:      Review of Systems: A complete review of systems was obtained and was negative except for what was noted above.    Physical examination:    /80   Pulse 76   Resp 16   SpO2 99%      General Appearance: NAD    Skin: There are no rashes or other skin lesions.    Musculoskeletal:  There is no scoliosis, lordosis, kyphosis, pes cavus, or hammertoes.    Neurologic examination:    Mental status:  Patient is alert, attentive, and oriented x 3.  Language is coherent and fluent without  aphasia.  Memory, comprehension and ability to follow commands were intact.       Cranial nerves:   Pupils were round and reacted to light.  Extraocular movements were full. There was no face, jaw, palate or tongue weakness or atrophy. Hearing was grossly intact.  Shoulder shrug was normal.       Motor exam: No atrophy or fasciculations.   Manual muscle testing revealed the following MRC grade muscle power:   Right Left   Neck flexion 5    Neck extension: 5    Shoulder abduction:  5 5   Elbow extension: 5 5   Elbow flexion:  5 5   Wrist flexion:  5 5   APB 5 5   Finger extension:  5 5   FDI 5 5   Hip extension 5 5   Knee flexion 5 5   Knee extension 5 5   Dorsiflexion 5 5    Plantar flexion 5 5     Complex motor skills: No tremor or ataxia    Sensory exam revealed normal perception of vibration, proprioception, light touch, pin, and temperature proximally and distally in the arms and legs bilaterally. Romberg sign was absent.    Sensory exam revealed decreased vibratory perception in the toes bilaterally. Proprioception was intact. Pinprick and temperature were decreased to the ankles bilaterally.  Light touch was normal.  Romberg sign was absent.       Gait: Narrow and stable.  She was able to walk on his heels, toes and tandem without any difficulty.       Deep tendon reflexes:   Right Left   Triceps 2 2   Biceps 2 2   Brachioradialis 2 2   Knee jerk 2 2   Ankle jerk 2 2   Plantar responses were flexor bilaterally.       Assessment:      Laurie is a 51-year-old woman who comes to us with the concern of pain and cramping in the lower extremities greater than the upper extremities for many years. Her symptoms have been present since she was a child, in terms of some pain and cramping, though this did not at all interfering with her physical abilities and in fact she has always been quite physically active.  Labs have shown a normal CK, normal QUETA antibody, EMG/NCS and multiple normal neurologic exams including today.  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  The duration of her symptoms is reassuring, and her degree of symptoms is actually improved over the past several years.  I explained that taking muscle relaxant such as Robaxin or trying tizanidine on a more regular basis may help her symptoms, rather than the more sporadic dosage she is now taking.  With her normal exams and prior testing, the yield of performing additional tests such as muscle biopsy, MRI, or skin biopsy for small fiber  neuropathy would be, I suspect, extremely low, so I would not recommend them.    Plan:      - Consider taking Robaxin, baclofen or tizanidine on a regular basis, for symptomatic control  -Please call or message if questions arise     Chapincito Damian MD  Neurophysiology fellow    This patient was seen and discussed with Dr. Santos who agrees with the assessment and plan above      ATTENDING ADDENDUM: Patient seen and examined with Fellow Dr Damian at the [facility] today. Agree with his assessment and plan. TT spent on this encounter today 35 minutes, of which 15 face to face with patient,10 in post-visit note review and editing, and 10 in pre-visit chart review. Mrs Pradhan has very longstanding muscle cramps. Her neurological examination is normal; she also has no myotonia. Previous EMG in 2017 was unremarkable. CK was normal as well twice in the last 10 years (<100). No family history of similar condition and no history of exercise intolerance or rhabdomyolysis in early life. It is very unlikely that further neuromuscular evaluation will be productive in this scenario. Recommend symptomatic management as above and follow up as needed. Lorenzo Santos MD

## 2021-11-22 ENCOUNTER — TELEPHONE (OUTPATIENT)
Dept: NEUROLOGY | Facility: CLINIC | Age: 51
End: 2021-11-22
Payer: COMMERCIAL

## 2021-11-22 NOTE — TELEPHONE ENCOUNTER
Health Call Center    Phone Message    May a detailed message be left on voicemail: yes     Reason for Call: Medication Question or concern regarding medication   Prescription Clarification  Name of Medication: Tizanidine-   Prescribing Provider: Dr. Santos   Pharmacy: [address] [address]     What on the order needs clarification? Patient is calling to requesting to try the Rx as discussed alice last office visit on 11/01/21 with Dr. Santos- Please call once request is complete.       Action Taken: Message routed to:  [facility] ([facility]    Travel Screening: Not Applicable

## 2021-11-22 NOTE — TELEPHONE ENCOUNTER
I am not planning any follow up appointment with the patient therefore I am requesting that she talks to her primary care provider about this treatment. I have sent him my note and recommendations. Thanks

## 2022-01-09 ENCOUNTER — HEALTH MAINTENANCE LETTER (OUTPATIENT)
Age: 52
End: 2022-01-09

## 2022-11-21 ENCOUNTER — HEALTH MAINTENANCE LETTER (OUTPATIENT)
Age: 52
End: 2022-11-21

## 2023-04-16 ENCOUNTER — HEALTH MAINTENANCE LETTER (OUTPATIENT)
Age: 53
End: 2023-04-16

## 2023-12-18 ENCOUNTER — TRANSFERRED RECORDS (OUTPATIENT)
Dept: HEALTH INFORMATION MANAGEMENT | Facility: CLINIC | Age: 53
End: 2023-12-18
Payer: COMMERCIAL

## 2024-06-23 ENCOUNTER — HEALTH MAINTENANCE LETTER (OUTPATIENT)
Age: 54
End: 2024-06-23